# Patient Record
Sex: FEMALE | Race: WHITE | Employment: OTHER | URBAN - METROPOLITAN AREA
[De-identification: names, ages, dates, MRNs, and addresses within clinical notes are randomized per-mention and may not be internally consistent; named-entity substitution may affect disease eponyms.]

---

## 2017-04-06 ENCOUNTER — HOSPITAL ENCOUNTER (OUTPATIENT)
Dept: LAB | Age: 67
Discharge: HOME | End: 2017-04-06
Attending: FAMILY MEDICINE | Admitting: FAMILY MEDICINE

## 2017-04-06 LAB
CK MB: 1.2 NG/ML
TROPONIN T: <0.01 NG/ML

## 2018-01-24 ENCOUNTER — HOSPITAL ENCOUNTER (OUTPATIENT)
Dept: WOMENS IMAGING | Age: 68
Discharge: HOME | End: 2018-01-24
Attending: OBSTETRICS & GYNECOLOGY | Admitting: OBSTETRICS & GYNECOLOGY

## 2018-01-24 DIAGNOSIS — M85.80 OSTEOPENIA, UNSPECIFIED LOCATION: ICD-10-CM

## 2018-01-24 DIAGNOSIS — Z12.31 VISIT FOR SCREENING MAMMOGRAM: ICD-10-CM

## 2018-01-24 DIAGNOSIS — Z78.0 MENOPAUSE: ICD-10-CM

## 2018-01-25 LAB — CA 125: 13

## 2019-02-05 ENCOUNTER — HOSPITAL ENCOUNTER (OUTPATIENT)
Dept: WOMENS IMAGING | Age: 69
Discharge: HOME | End: 2019-02-05
Payer: MEDICARE

## 2019-02-05 ENCOUNTER — HOSPITAL ENCOUNTER (OUTPATIENT)
Age: 69
Discharge: HOME | End: 2019-02-05
Payer: MEDICARE

## 2019-02-05 DIAGNOSIS — Z12.31 SCREENING MAMMOGRAM, ENCOUNTER FOR: ICD-10-CM

## 2019-02-05 PROCEDURE — 77067 SCR MAMMO BI INCL CAD: CPT

## 2019-02-05 PROCEDURE — 86304 IMMUNOASSAY TUMOR CA 125: CPT

## 2019-02-05 PROCEDURE — 36415 COLL VENOUS BLD VENIPUNCTURE: CPT

## 2019-02-07 LAB — CA 125: 14

## 2019-08-06 ENCOUNTER — HOSPITAL ENCOUNTER (OUTPATIENT)
Dept: CT IMAGING | Age: 69
Discharge: HOME | End: 2019-08-06
Payer: MEDICARE

## 2019-08-06 DIAGNOSIS — R10.30 INGUINAL PAIN, UNSPECIFIED LATERALITY: ICD-10-CM

## 2019-08-06 PROCEDURE — 6360000004 HC RX CONTRAST MEDICATION: Performed by: FAMILY MEDICINE

## 2019-08-06 PROCEDURE — 74177 CT ABD & PELVIS W/CONTRAST: CPT

## 2019-08-06 RX ADMIN — IOPAMIDOL 75 ML: 755 INJECTION, SOLUTION INTRAVENOUS at 11:14

## 2020-02-12 ENCOUNTER — HOSPITAL ENCOUNTER (OUTPATIENT)
Dept: MAMMOGRAPHY | Age: 70
Discharge: HOME | End: 2020-02-12
Payer: MEDICARE

## 2020-02-12 PROCEDURE — 77063 BREAST TOMOSYNTHESIS BI: CPT

## 2020-08-14 ENCOUNTER — APPOINTMENT (OUTPATIENT)
Dept: GENERAL RADIOLOGY | Age: 70
End: 2020-08-14
Payer: MEDICARE

## 2020-08-14 ENCOUNTER — HOSPITAL ENCOUNTER (OUTPATIENT)
Age: 70
Setting detail: OBSERVATION
Discharge: HOME | End: 2020-08-17
Attending: EMERGENCY MEDICINE | Admitting: INTERNAL MEDICINE
Payer: MEDICARE

## 2020-08-14 PROBLEM — R07.9 CHEST PAIN: Status: ACTIVE | Noted: 2020-08-14

## 2020-08-14 LAB
ALBUMIN SERPL-MCNC: 4.3 GM/DL (ref 3.4–5)
ALP BLD-CCNC: 63 IU/L (ref 40–129)
ALT SERPL-CCNC: 12 U/L (ref 10–40)
ANION GAP SERPL CALCULATED.3IONS-SCNC: 12 MMOL/L (ref 4–16)
AST SERPL-CCNC: 21 IU/L (ref 15–37)
BASOPHILS ABSOLUTE: 0.1 K/CU MM
BASOPHILS RELATIVE PERCENT: 0.8 % (ref 0–1)
BILIRUB SERPL-MCNC: 0.6 MG/DL (ref 0–1)
BUN BLDV-MCNC: 24 MG/DL (ref 6–23)
CALCIUM SERPL-MCNC: 9.8 MG/DL (ref 8.3–10.6)
CHLORIDE BLD-SCNC: 106 MMOL/L (ref 99–110)
CO2: 24 MMOL/L (ref 21–32)
CREAT SERPL-MCNC: 1 MG/DL (ref 0.6–1.1)
D DIMER: <0.47 UG/ML (FEU)
DIFFERENTIAL TYPE: ABNORMAL
EKG ATRIAL RATE: 70 BPM
EKG DIAGNOSIS: NORMAL
EKG P AXIS: 48 DEGREES
EKG P-R INTERVAL: 148 MS
EKG Q-T INTERVAL: 398 MS
EKG QRS DURATION: 76 MS
EKG QTC CALCULATION (BAZETT): 429 MS
EKG R AXIS: 55 DEGREES
EKG T AXIS: 43 DEGREES
EKG VENTRICULAR RATE: 70 BPM
EOSINOPHILS ABSOLUTE: 0.1 K/CU MM
EOSINOPHILS RELATIVE PERCENT: 0.8 % (ref 0–3)
GFR AFRICAN AMERICAN: >60 ML/MIN/1.73M2
GFR NON-AFRICAN AMERICAN: 55 ML/MIN/1.73M2
GLUCOSE BLD-MCNC: 92 MG/DL (ref 70–99)
HCT VFR BLD CALC: 43.7 % (ref 37–47)
HCT VFR BLD CALC: 45 % (ref 37–47)
HEMOGLOBIN: 14.5 GM/DL (ref 12.5–16)
HEMOGLOBIN: 14.5 GM/DL (ref 12.5–16)
IMMATURE NEUTROPHIL %: 0.2 % (ref 0–0.43)
LYMPHOCYTES ABSOLUTE: 1.6 K/CU MM
LYMPHOCYTES RELATIVE PERCENT: 26.9 % (ref 24–44)
MCH RBC QN AUTO: 29.7 PG (ref 27–31)
MCH RBC QN AUTO: 30.1 PG (ref 27–31)
MCHC RBC AUTO-ENTMCNC: 32.2 % (ref 32–36)
MCHC RBC AUTO-ENTMCNC: 33.2 % (ref 32–36)
MCV RBC AUTO: 90.9 FL (ref 78–100)
MCV RBC AUTO: 92.2 FL (ref 78–100)
MONOCYTES ABSOLUTE: 0.3 K/CU MM
MONOCYTES RELATIVE PERCENT: 5 % (ref 0–4)
PDW BLD-RTO: 12.8 % (ref 11.7–14.9)
PDW BLD-RTO: 12.9 % (ref 11.7–14.9)
PLATELET # BLD: 156 K/CU MM (ref 140–440)
PLATELET # BLD: 164 K/CU MM (ref 140–440)
PMV BLD AUTO: 9.3 FL (ref 7.5–11.1)
PMV BLD AUTO: 9.6 FL (ref 7.5–11.1)
POTASSIUM SERPL-SCNC: 4.3 MMOL/L (ref 3.5–5.1)
RBC # BLD: 4.81 M/CU MM (ref 4.2–5.4)
RBC # BLD: 4.88 M/CU MM (ref 4.2–5.4)
REASON FOR REJECTION: NORMAL
REASON FOR REJECTION: NORMAL
REJECTED TEST: NORMAL
REJECTED TEST: NORMAL
SEGMENTED NEUTROPHILS ABSOLUTE COUNT: 4 K/CU MM
SEGMENTED NEUTROPHILS RELATIVE PERCENT: 66.3 % (ref 36–66)
SODIUM BLD-SCNC: 142 MMOL/L (ref 135–145)
TOTAL IMMATURE NEUTOROPHIL: 0.01 K/CU MM
TOTAL PROTEIN: 6.6 GM/DL (ref 6.4–8.2)
TROPONIN T: <0.01 NG/ML
WBC # BLD: 6 K/CU MM (ref 4–10.5)
WBC # BLD: 6.9 K/CU MM (ref 4–10.5)

## 2020-08-14 PROCEDURE — 94761 N-INVAS EAR/PLS OXIMETRY MLT: CPT

## 2020-08-14 PROCEDURE — G0378 HOSPITAL OBSERVATION PER HR: HCPCS

## 2020-08-14 PROCEDURE — 84484 ASSAY OF TROPONIN QUANT: CPT

## 2020-08-14 PROCEDURE — 71045 X-RAY EXAM CHEST 1 VIEW: CPT

## 2020-08-14 PROCEDURE — 93010 ELECTROCARDIOGRAM REPORT: CPT | Performed by: INTERNAL MEDICINE

## 2020-08-14 PROCEDURE — 85025 COMPLETE CBC W/AUTO DIFF WBC: CPT

## 2020-08-14 PROCEDURE — 80053 COMPREHEN METABOLIC PANEL: CPT

## 2020-08-14 PROCEDURE — 99285 EMERGENCY DEPT VISIT HI MDM: CPT

## 2020-08-14 PROCEDURE — 85027 COMPLETE CBC AUTOMATED: CPT

## 2020-08-14 PROCEDURE — 80048 BASIC METABOLIC PNL TOTAL CA: CPT

## 2020-08-14 PROCEDURE — 93005 ELECTROCARDIOGRAM TRACING: CPT | Performed by: EMERGENCY MEDICINE

## 2020-08-14 PROCEDURE — 85379 FIBRIN DEGRADATION QUANT: CPT

## 2020-08-14 RX ORDER — NITROGLYCERIN 0.4 MG/1
0.4 TABLET SUBLINGUAL EVERY 5 MIN PRN
Status: DISCONTINUED | OUTPATIENT
Start: 2020-08-14 | End: 2020-08-17 | Stop reason: HOSPADM

## 2020-08-14 RX ORDER — PANTOPRAZOLE SODIUM 40 MG/1
40 GRANULE, DELAYED RELEASE ORAL
COMMUNITY

## 2020-08-14 RX ORDER — POLYETHYLENE GLYCOL 3350 17 G/17G
17 POWDER, FOR SOLUTION ORAL DAILY PRN
Status: DISCONTINUED | OUTPATIENT
Start: 2020-08-14 | End: 2020-08-17 | Stop reason: HOSPADM

## 2020-08-14 RX ORDER — MONTELUKAST SODIUM 10 MG/1
10 TABLET ORAL NIGHTLY
COMMUNITY
End: 2022-01-06

## 2020-08-14 RX ORDER — ACETAMINOPHEN 650 MG/1
650 SUPPOSITORY RECTAL EVERY 6 HOURS PRN
Status: DISCONTINUED | OUTPATIENT
Start: 2020-08-14 | End: 2020-08-17 | Stop reason: HOSPADM

## 2020-08-14 RX ORDER — FLUTICASONE PROPIONATE 50 MCG
2 SPRAY, SUSPENSION (ML) NASAL DAILY
COMMUNITY

## 2020-08-14 RX ORDER — SERTRALINE HYDROCHLORIDE 100 MG/1
100 TABLET, FILM COATED ORAL DAILY
COMMUNITY

## 2020-08-14 RX ORDER — SODIUM CHLORIDE 9 MG/ML
INJECTION, SOLUTION INTRAVENOUS CONTINUOUS
Status: ACTIVE | OUTPATIENT
Start: 2020-08-14 | End: 2020-08-15

## 2020-08-14 RX ORDER — PROMETHAZINE HYDROCHLORIDE 25 MG/1
12.5 TABLET ORAL EVERY 6 HOURS PRN
Status: DISCONTINUED | OUTPATIENT
Start: 2020-08-14 | End: 2020-08-17 | Stop reason: HOSPADM

## 2020-08-14 RX ORDER — GABAPENTIN 100 MG/1
100 CAPSULE ORAL 3 TIMES DAILY
COMMUNITY

## 2020-08-14 RX ORDER — M-VIT,TX,IRON,MINS/CALC/FOLIC 27MG-0.4MG
1 TABLET ORAL DAILY
COMMUNITY

## 2020-08-14 RX ORDER — AZELASTINE 1 MG/ML
1 SPRAY, METERED NASAL 2 TIMES DAILY
COMMUNITY

## 2020-08-14 RX ORDER — OMEGA-3S/DHA/EPA/FISH OIL/D3 300MG-1000
1000 CAPSULE ORAL DAILY
COMMUNITY

## 2020-08-14 RX ORDER — SODIUM CHLORIDE 0.9 % (FLUSH) 0.9 %
10 SYRINGE (ML) INJECTION PRN
Status: DISCONTINUED | OUTPATIENT
Start: 2020-08-14 | End: 2020-08-17 | Stop reason: HOSPADM

## 2020-08-14 RX ORDER — ONDANSETRON 2 MG/ML
4 INJECTION INTRAMUSCULAR; INTRAVENOUS EVERY 6 HOURS PRN
Status: DISCONTINUED | OUTPATIENT
Start: 2020-08-14 | End: 2020-08-17 | Stop reason: HOSPADM

## 2020-08-14 RX ORDER — ESTRADIOL 0.1 MG/G
2 CREAM VAGINAL DAILY
COMMUNITY

## 2020-08-14 RX ORDER — ACETAMINOPHEN 325 MG/1
650 TABLET ORAL EVERY 6 HOURS PRN
Status: DISCONTINUED | OUTPATIENT
Start: 2020-08-14 | End: 2020-08-17 | Stop reason: HOSPADM

## 2020-08-14 RX ORDER — DOCUSATE SODIUM 100 MG/1
100 CAPSULE, LIQUID FILLED ORAL 2 TIMES DAILY
COMMUNITY

## 2020-08-14 RX ORDER — EZETIMIBE 10 MG/1
10 TABLET ORAL DAILY
COMMUNITY

## 2020-08-14 RX ORDER — SODIUM CHLORIDE 0.9 % (FLUSH) 0.9 %
10 SYRINGE (ML) INJECTION EVERY 12 HOURS SCHEDULED
Status: DISCONTINUED | OUTPATIENT
Start: 2020-08-14 | End: 2020-08-17 | Stop reason: HOSPADM

## 2020-08-14 ASSESSMENT — PAIN SCALES - GENERAL
PAINLEVEL_OUTOF10: 8
PAINLEVEL_OUTOF10: 9

## 2020-08-14 ASSESSMENT — ENCOUNTER SYMPTOMS
CHEST TIGHTNESS: 1
VOMITING: 0
EYE REDNESS: 0
RHINORRHEA: 0
NAUSEA: 0
SHORTNESS OF BREATH: 0
COUGH: 0
ABDOMINAL PAIN: 0
BACK PAIN: 0
SORE THROAT: 0

## 2020-08-14 ASSESSMENT — PAIN DESCRIPTION - FREQUENCY: FREQUENCY: CONTINUOUS

## 2020-08-14 ASSESSMENT — PAIN DESCRIPTION - LOCATION
LOCATION: CHEST
LOCATION: CHEST

## 2020-08-14 ASSESSMENT — PAIN DESCRIPTION - PAIN TYPE
TYPE: ACUTE PAIN
TYPE: ACUTE PAIN

## 2020-08-14 ASSESSMENT — PAIN DESCRIPTION - DESCRIPTORS: DESCRIPTORS: PRESSURE

## 2020-08-14 NOTE — ED PROVIDER NOTES
Inability: Not on file    Transportation needs     Medical: Not on file     Non-medical: Not on file   Tobacco Use    Smoking status: Never Smoker   Substance and Sexual Activity    Alcohol use: Never    Drug use: Never    Sexual activity: Never   Lifestyle    Physical activity     Days per week: Not on file     Minutes per session: Not on file    Stress: Not on file   Relationships    Social connections     Talks on phone: Not on file     Gets together: Not on file     Attends Druze service: Not on file     Active member of club or organization: Not on file     Attends meetings of clubs or organizations: Not on file     Relationship status: Not on file    Intimate partner violence     Fear of current or ex partner: Not on file     Emotionally abused: Not on file     Physically abused: Not on file     Forced sexual activity: Not on file   Other Topics Concern    Not on file   Social History Narrative    Not on file     No current facility-administered medications for this encounter. No current outpatient medications on file. Allergies   Allergen Reactions    Pcn [Penicillins] Hives    Statins      Pain in legs     Sulfa Antibiotics      Leg cramps     Vioxx [Rofecoxib]      Heart palpitation        Nursing Notes Reviewed     Physical Exam:   ED Triage Vitals   Enc Vitals Group      BP       Pulse       Resp       Temp       Temp src       SpO2       Weight       Height       Head Circumference       Peak Flow       Pain Score       Pain Loc       Pain Edu? Excl. in [address]? BP (!) 110/57   Pulse 60   Temp 97.3 °F (36.3 °C) (Temporal)   Resp 13   Ht 5' 5\" (1.651 m)   Wt 151 lb (68.5 kg)   SpO2 94%   BMI 25.13 kg/m²   My pulse ox interpretation is - normal  Physical Exam  Vitals signs and nursing note reviewed. Constitutional:       General: She is not in acute distress. Appearance: Normal appearance. She is not toxic-appearing or diaphoretic.    HENT:      Head: Normocephalic and atraumatic. Eyes:      General:         Right eye: No discharge. Left eye: No discharge. Conjunctiva/sclera: Conjunctivae normal.   Cardiovascular:      Rate and Rhythm: Normal rate and regular rhythm. Pulses: Normal pulses. Radial pulses are 2+ on the right side and 2+ on the left side. Pulmonary:      Effort: Pulmonary effort is normal. No respiratory distress. Breath sounds: No wheezing or rales. Abdominal:      General: There is no distension. Tenderness: There is no abdominal tenderness. Musculoskeletal: Normal range of motion. General: No swelling or tenderness. Skin:     General: Skin is warm and dry. Neurological:      General: No focal deficit present. Mental Status: She is alert. Cranial Nerves: No cranial nerve deficit.    Psychiatric:         Mood and Affect: Mood normal.         Behavior: Behavior normal.         I have reviewed and interpreted all of the currently available lab results from this visit (if applicable):  Results for orders placed or performed during the hospital encounter of 08/14/20   CBC Auto Differential   Result Value Ref Range    WBC 6.0 4.0 - 10.5 K/CU MM    RBC 4.81 4.2 - 5.4 M/CU MM    Hemoglobin 14.5 12.5 - 16.0 GM/DL    Hematocrit 43.7 37 - 47 %    MCV 90.9 78 - 100 FL    MCH 30.1 27 - 31 PG    MCHC 33.2 32.0 - 36.0 %    RDW 12.8 11.7 - 14.9 %    Platelets 282 995 - 603 K/CU MM    MPV 9.6 7.5 - 11.1 FL    Differential Type AUTOMATED DIFFERENTIAL     Segs Relative 66.3 (H) 36 - 66 %    Lymphocytes % 26.9 24 - 44 %    Monocytes % 5.0 (H) 0 - 4 %    Eosinophils % 0.8 0 - 3 %    Basophils % 0.8 0 - 1 %    Segs Absolute 4.0 K/CU MM    Lymphocytes Absolute 1.6 K/CU MM    Monocytes Absolute 0.3 K/CU MM    Eosinophils Absolute 0.1 K/CU MM    Basophils Absolute 0.1 K/CU MM    Immature Neutrophil % 0.2 0 - 0.43 %    Total Immature Neutrophil 0.01 K/CU MM   D-Dimer, Rapid   Result Value Ref Range D-Dimer, Quant <0.47 <0.47 ug/mL (FEU)   SPECIMEN REJECTION   Result Value Ref Range    Rejected Test TROT     Reason for Rejection UNABLE TO PERFORM TESTING:    SPECIMEN REJECTION   Result Value Ref Range    Rejected Test CMPX     Reason for Rejection UNABLE TO PERFORM TESTING:    Comprehensive Metabolic Panel w/ Reflex to MG   Result Value Ref Range    Sodium 142 135 - 145 MMOL/L    Potassium 4.3 3.5 - 5.1 MMOL/L    Chloride 106 99 - 110 mMol/L    CO2 24 21 - 32 MMOL/L    BUN 24 (H) 6 - 23 MG/DL    CREATININE 1.0 0.6 - 1.1 MG/DL    Glucose 92 70 - 99 MG/DL    Calcium 9.8 8.3 - 10.6 MG/DL    Alb 4.3 3.4 - 5.0 GM/DL    Total Protein 6.6 6.4 - 8.2 GM/DL    Total Bilirubin 0.6 0.0 - 1.0 MG/DL    ALT 12 10 - 40 U/L    AST 21 15 - 37 IU/L    Alkaline Phosphatase 63 40 - 129 IU/L    GFR Non- 55 (L) >60 mL/min/1.73m2    GFR African American >60 >60 mL/min/1.73m2    Anion Gap 12 4 - 16   Troponin   Result Value Ref Range    Troponin T <0.010 <0.01 NG/ML   EKG 12 Lead   Result Value Ref Range    Ventricular Rate 70 BPM    Atrial Rate 70 BPM    P-R Interval 148 ms    QRS Duration 76 ms    Q-T Interval 398 ms    QTc Calculation (Bazett) 429 ms    P Axis 48 degrees    R Axis 55 degrees    T Axis 43 degrees    Diagnosis       Normal sinus rhythm  Nonspecific T wave abnormality  Abnormal ECG  No previous ECGs available  Confirmed by [facility] Sahil RAMIREZ ([id number]) on 8/14/2020 4:17:10 PM        Radiographs (if obtained):  [] The following radiograph was interpreted by myself in the absence of a radiologist:  [x]Radiologist's Report Reviewed:  XR CHEST PORTABLE   Final Result   No acute process. EKG (if obtained): (All EKG's are interpreted by myself in the absence of a cardiologist)  Normal sinus rhythm with a rate of 70. RI interval 148, QRS 76, QTc 429. No ST elevations or depressions. Nonspecific T waves. Impression: Abnormal EKG. No previous EKGs for comparison.     MDM:  Differential diagnoses considered include but are not limited to acute coronary syndrome, Pulmonary embolism, pneumothorax, pneumonia, costochondritis, aortic dissection, GERD    ED course:   EKG has some T wave inversions but no ST elevations or depressions. Basic labs were obtained and are unremarkable. Initial troponin is negative. Pt is low risk for PE by Well's criteria and PERC positive 2/2 age. D-dimer obtained and it is negative. No further work up for pulmonary embolism is warranted. I do not suspect  pulmonary embolism. Chest x-ray shows no acute cardiopulmonary abnormalities. No pneumothorax and no pneumonia. No mediastinal widening. Equal and intact peripheral pulses. Pain is not tearing in quality or migratory. No neurologic deficits. I do not suspect an aortic dissection. Heart Score = 5   0-3 Delta troponin and discharge  4-6 Observation chest pain protocol  7+ Admission to cardiology  History   Highly suspicious -- +2   Moderately suspicious -- +1   Slightly suspicious -- 0   EKG   Significant ST depression -- +2   Nonspecific repolarization disturbance -- +1   Normal -- 0   Age   ? 65 -- +2   45-65 -- +1   ? 45 -- 0  Risk Factors   Risk factors include:   Hypercholesterolemia   Hypertension   Diabetes Mellitus   Cigarette smoking   Positive family history   Obesity (BMI > 30)  ? 3 risk factors or history of atherosclerotic disease -- +2   1-2 risk factors -- +1   No risk factors known -- 0  Troponin   ? 3× normal limit -- +2   1-3× normal limit -- +1   ? normal limit -- 0  *The HEART Score is a prospectively studied scoring system to help emergency physicians risk-stratify chest pain patients who are at risk for all-cause mortality, myocardial infarction, or coronary revascularization in the next 6 weeks. Low risk patients have a score 0-3 and have a less than 2% risk of major event at 6 weeks. *    Cardiac monitor revealed sinus rhythm with a heart rate of 70 as interpreted by me.   Patient presented with chest pain and cardiac monitor was ordered to monitor for dysrhythmia. Given patient's heart score of 5, recommended admission for cardiac rule out. Patient is in agreement with this. Plan [facility] for admission and cardiac rule out. I spoke with Dr. Marifer Urrutia who accepted patient in transfer. Plan of care explained to patient. All questions and concerns were addressed to the patient's satisfaction. Patient understood and agreed with plan. I did don appropriate PPE (including N95 face mask, protective eye ware/safety glasses, gloves, hair covering, and no isolation gown), as recommended by the health facility/national standard best practice, during my bedside interactions with the patient. Clinical Impression:  1. Chest pain, unspecified type          Daniela Cobb MD       Please note that portions of this note may have been complete with a voice recognition program.  Effortswere made to edit the dictations, but occasional words are mis-transcribed. Daniela Cobb MD  08/14/20 1703    Unable to get ALS transport for over 12 hours. Discussed this with patient and her . Discussed the risk of BLS transport and patient not being on the monitor for the time between the 2 hospitals. They understand this risk and believe it would be better to be transported sooner to the main hospital as opposed to wait even longer in this emergency department. I agree with that. We will transfer patient via BLS.         Daniela Cobb MD  08/14/20 3495

## 2020-08-14 NOTE — ED NOTES
RN attempted to arrange ALS transport for patient with [facility], [address] [facility], [place] [address], [facility], [facility], [facility], and [facility].   All services stated they could not provide transport until tomorrow morning after 7AM.  Dr. Gregg Alford notified     Shailesh Bates, RANDY  08/14/20 5570

## 2020-08-14 NOTE — ED NOTES
BLS transport agreed upon by [facility] and pt. QCT eta midnight. Pt moved from ed 3 to ed 2 due to TV issues. Pt denies needs at this time.  Plan of care updated     Peter Wang, RANDY  08/14/20 1519

## 2020-08-15 LAB
ANION GAP SERPL CALCULATED.3IONS-SCNC: 12 MMOL/L (ref 4–16)
ANION GAP SERPL CALCULATED.3IONS-SCNC: 8 MMOL/L (ref 4–16)
BASOPHILS ABSOLUTE: 0.1 K/CU MM
BASOPHILS RELATIVE PERCENT: 0.8 % (ref 0–1)
BUN BLDV-MCNC: 22 MG/DL (ref 6–23)
BUN BLDV-MCNC: 24 MG/DL (ref 6–23)
CALCIUM SERPL-MCNC: 9.2 MG/DL (ref 8.3–10.6)
CALCIUM SERPL-MCNC: 9.6 MG/DL (ref 8.3–10.6)
CHLORIDE BLD-SCNC: 107 MMOL/L (ref 99–110)
CHLORIDE BLD-SCNC: 108 MMOL/L (ref 99–110)
CO2: 25 MMOL/L (ref 21–32)
CO2: 27 MMOL/L (ref 21–32)
CREAT SERPL-MCNC: 0.9 MG/DL (ref 0.6–1.1)
CREAT SERPL-MCNC: 1 MG/DL (ref 0.6–1.1)
DIFFERENTIAL TYPE: ABNORMAL
EKG ATRIAL RATE: 58 BPM
EKG DIAGNOSIS: NORMAL
EKG P AXIS: 51 DEGREES
EKG P-R INTERVAL: 164 MS
EKG Q-T INTERVAL: 430 MS
EKG QRS DURATION: 76 MS
EKG QTC CALCULATION (BAZETT): 422 MS
EKG R AXIS: 66 DEGREES
EKG T AXIS: 65 DEGREES
EKG VENTRICULAR RATE: 58 BPM
EOSINOPHILS ABSOLUTE: 0.1 K/CU MM
EOSINOPHILS RELATIVE PERCENT: 1.7 % (ref 0–3)
GFR AFRICAN AMERICAN: >60 ML/MIN/1.73M2
GFR AFRICAN AMERICAN: >60 ML/MIN/1.73M2
GFR NON-AFRICAN AMERICAN: 55 ML/MIN/1.73M2
GFR NON-AFRICAN AMERICAN: >60 ML/MIN/1.73M2
GLUCOSE BLD-MCNC: 102 MG/DL (ref 70–99)
GLUCOSE BLD-MCNC: 106 MG/DL (ref 70–99)
HCT VFR BLD CALC: 42.5 % (ref 37–47)
HEMOGLOBIN: 13.6 GM/DL (ref 12.5–16)
IMMATURE NEUTROPHIL %: 0.2 % (ref 0–0.43)
LYMPHOCYTES ABSOLUTE: 2.1 K/CU MM
LYMPHOCYTES RELATIVE PERCENT: 31.5 % (ref 24–44)
MCH RBC QN AUTO: 29.8 PG (ref 27–31)
MCHC RBC AUTO-ENTMCNC: 32 % (ref 32–36)
MCV RBC AUTO: 93.2 FL (ref 78–100)
MONOCYTES ABSOLUTE: 0.5 K/CU MM
MONOCYTES RELATIVE PERCENT: 6.9 % (ref 0–4)
NUCLEATED RBC %: 0 %
PDW BLD-RTO: 12.9 % (ref 11.7–14.9)
PLATELET # BLD: 158 K/CU MM (ref 140–440)
PMV BLD AUTO: 9.5 FL (ref 7.5–11.1)
POTASSIUM SERPL-SCNC: 4.1 MMOL/L (ref 3.5–5.1)
POTASSIUM SERPL-SCNC: 4.3 MMOL/L (ref 3.5–5.1)
RBC # BLD: 4.56 M/CU MM (ref 4.2–5.4)
SEGMENTED NEUTROPHILS ABSOLUTE COUNT: 3.9 K/CU MM
SEGMENTED NEUTROPHILS RELATIVE PERCENT: 58.9 % (ref 36–66)
SODIUM BLD-SCNC: 143 MMOL/L (ref 135–145)
SODIUM BLD-SCNC: 144 MMOL/L (ref 135–145)
TOTAL IMMATURE NEUTOROPHIL: 0.01 K/CU MM
TOTAL NUCLEATED RBC: 0 K/CU MM
TROPONIN T: <0.01 NG/ML
TROPONIN T: <0.01 NG/ML
WBC # BLD: 6.6 K/CU MM (ref 4–10.5)

## 2020-08-15 PROCEDURE — 6370000000 HC RX 637 (ALT 250 FOR IP): Performed by: NURSE PRACTITIONER

## 2020-08-15 PROCEDURE — 6370000000 HC RX 637 (ALT 250 FOR IP): Performed by: INTERNAL MEDICINE

## 2020-08-15 PROCEDURE — G0378 HOSPITAL OBSERVATION PER HR: HCPCS

## 2020-08-15 PROCEDURE — 84484 ASSAY OF TROPONIN QUANT: CPT

## 2020-08-15 PROCEDURE — 6360000002 HC RX W HCPCS: Performed by: NURSE PRACTITIONER

## 2020-08-15 PROCEDURE — 99215 OFFICE O/P EST HI 40 MIN: CPT | Performed by: INTERNAL MEDICINE

## 2020-08-15 PROCEDURE — 93010 ELECTROCARDIOGRAM REPORT: CPT | Performed by: INTERNAL MEDICINE

## 2020-08-15 PROCEDURE — 96372 THER/PROPH/DIAG INJ SC/IM: CPT

## 2020-08-15 PROCEDURE — 85025 COMPLETE CBC W/AUTO DIFF WBC: CPT

## 2020-08-15 PROCEDURE — 80048 BASIC METABOLIC PNL TOTAL CA: CPT

## 2020-08-15 PROCEDURE — 93005 ELECTROCARDIOGRAM TRACING: CPT | Performed by: NURSE PRACTITIONER

## 2020-08-15 PROCEDURE — 36415 COLL VENOUS BLD VENIPUNCTURE: CPT

## 2020-08-15 PROCEDURE — 2580000003 HC RX 258: Performed by: NURSE PRACTITIONER

## 2020-08-15 PROCEDURE — 94761 N-INVAS EAR/PLS OXIMETRY MLT: CPT

## 2020-08-15 RX ORDER — NITROGLYCERIN 0.4 MG/1
0.4 TABLET SUBLINGUAL EVERY 5 MIN PRN
Status: DISCONTINUED | OUTPATIENT
Start: 2020-08-15 | End: 2020-08-17 | Stop reason: HOSPADM

## 2020-08-15 RX ORDER — EZETIMIBE 10 MG/1
10 TABLET ORAL DAILY
Status: DISCONTINUED | OUTPATIENT
Start: 2020-08-15 | End: 2020-08-17 | Stop reason: HOSPADM

## 2020-08-15 RX ORDER — SERTRALINE HYDROCHLORIDE 100 MG/1
100 TABLET, FILM COATED ORAL DAILY
Status: DISCONTINUED | OUTPATIENT
Start: 2020-08-15 | End: 2020-08-17 | Stop reason: HOSPADM

## 2020-08-15 RX ORDER — SUCRALFATE 1 G/1
1 TABLET ORAL EVERY 6 HOURS SCHEDULED
Status: DISCONTINUED | OUTPATIENT
Start: 2020-08-15 | End: 2020-08-17 | Stop reason: HOSPADM

## 2020-08-15 RX ORDER — ESTRADIOL 0.1 MG/G
2 CREAM VAGINAL DAILY
Status: DISCONTINUED | OUTPATIENT
Start: 2020-08-15 | End: 2020-08-17 | Stop reason: HOSPADM

## 2020-08-15 RX ORDER — PANTOPRAZOLE SODIUM 40 MG/1
40 TABLET, DELAYED RELEASE ORAL ONCE
Status: COMPLETED | OUTPATIENT
Start: 2020-08-15 | End: 2020-08-15

## 2020-08-15 RX ORDER — GABAPENTIN 100 MG/1
100 CAPSULE ORAL 3 TIMES DAILY
Status: DISCONTINUED | OUTPATIENT
Start: 2020-08-15 | End: 2020-08-17 | Stop reason: HOSPADM

## 2020-08-15 RX ORDER — MONTELUKAST SODIUM 10 MG/1
10 TABLET ORAL NIGHTLY
Status: DISCONTINUED | OUTPATIENT
Start: 2020-08-15 | End: 2020-08-17 | Stop reason: HOSPADM

## 2020-08-15 RX ORDER — M-VIT,TX,IRON,MINS/CALC/FOLIC 27MG-0.4MG
1 TABLET ORAL DAILY
Status: DISCONTINUED | OUTPATIENT
Start: 2020-08-15 | End: 2020-08-17 | Stop reason: HOSPADM

## 2020-08-15 RX ORDER — AZELASTINE 1 MG/ML
2 SPRAY, METERED NASAL 2 TIMES DAILY
Status: DISCONTINUED | OUTPATIENT
Start: 2020-08-15 | End: 2020-08-17 | Stop reason: HOSPADM

## 2020-08-15 RX ORDER — FLUTICASONE PROPIONATE 50 MCG
2 SPRAY, SUSPENSION (ML) NASAL DAILY
Status: DISCONTINUED | OUTPATIENT
Start: 2020-08-15 | End: 2020-08-17 | Stop reason: HOSPADM

## 2020-08-15 RX ORDER — PANTOPRAZOLE SODIUM 40 MG/1
40 TABLET, DELAYED RELEASE ORAL
Status: DISCONTINUED | OUTPATIENT
Start: 2020-08-16 | End: 2020-08-17 | Stop reason: HOSPADM

## 2020-08-15 RX ORDER — CALCIUM CARBONATE 200(500)MG
500 TABLET,CHEWABLE ORAL 3 TIMES DAILY PRN
Status: DISCONTINUED | OUTPATIENT
Start: 2020-08-15 | End: 2020-08-17 | Stop reason: HOSPADM

## 2020-08-15 RX ORDER — DOCUSATE SODIUM 100 MG/1
100 CAPSULE, LIQUID FILLED ORAL 2 TIMES DAILY
Status: DISCONTINUED | OUTPATIENT
Start: 2020-08-15 | End: 2020-08-17 | Stop reason: HOSPADM

## 2020-08-15 RX ADMIN — SODIUM CHLORIDE, PRESERVATIVE FREE 10 ML: 5 INJECTION INTRAVENOUS at 10:29

## 2020-08-15 RX ADMIN — GABAPENTIN 100 MG: 100 CAPSULE ORAL at 14:10

## 2020-08-15 RX ADMIN — MULTIPLE VITAMINS W/ MINERALS TAB 1 TABLET: TAB at 10:27

## 2020-08-15 RX ADMIN — FLUTICASONE PROPIONATE 2 SPRAY: 50 SPRAY, METERED NASAL at 10:22

## 2020-08-15 RX ADMIN — EZETIMIBE 10 MG: 10 TABLET ORAL at 21:33

## 2020-08-15 RX ADMIN — SODIUM CHLORIDE, PRESERVATIVE FREE 10 ML: 5 INJECTION INTRAVENOUS at 00:33

## 2020-08-15 RX ADMIN — ENOXAPARIN SODIUM 30 MG: 30 INJECTION SUBCUTANEOUS at 10:25

## 2020-08-15 RX ADMIN — MONTELUKAST 10 MG: 10 TABLET, FILM COATED ORAL at 21:33

## 2020-08-15 RX ADMIN — PANTOPRAZOLE SODIUM 40 MG: 40 TABLET, DELAYED RELEASE ORAL at 10:24

## 2020-08-15 RX ADMIN — SERTRALINE HYDROCHLORIDE 100 MG: 100 TABLET ORAL at 21:32

## 2020-08-15 RX ADMIN — SODIUM CHLORIDE: 9 INJECTION, SOLUTION INTRAVENOUS at 00:33

## 2020-08-15 RX ADMIN — OYSTER SHELL CALCIUM WITH VITAMIN D 1 TABLET: 500; 200 TABLET, FILM COATED ORAL at 10:24

## 2020-08-15 RX ADMIN — SUCRALFATE 1 G: 1 TABLET ORAL at 11:57

## 2020-08-15 RX ADMIN — METOPROLOL TARTRATE 12.5 MG: 25 TABLET, FILM COATED ORAL at 00:33

## 2020-08-15 RX ADMIN — ACETAMINOPHEN 650 MG: 325 TABLET ORAL at 10:24

## 2020-08-15 RX ADMIN — METOPROLOL TARTRATE 12.5 MG: 25 TABLET, FILM COATED ORAL at 10:24

## 2020-08-15 RX ADMIN — MONTELUKAST 10 MG: 10 TABLET, FILM COATED ORAL at 00:33

## 2020-08-15 RX ADMIN — GABAPENTIN 100 MG: 100 CAPSULE ORAL at 10:24

## 2020-08-15 RX ADMIN — SODIUM CHLORIDE, PRESERVATIVE FREE 10 ML: 5 INJECTION INTRAVENOUS at 21:33

## 2020-08-15 RX ADMIN — GABAPENTIN 100 MG: 100 CAPSULE ORAL at 21:32

## 2020-08-15 RX ADMIN — Medication 1000 UNITS: at 10:27

## 2020-08-15 RX ADMIN — NITROGLYCERIN 0.4 MG: 0.4 TABLET SUBLINGUAL at 14:08

## 2020-08-15 RX ADMIN — SUCRALFATE 1 G: 1 TABLET ORAL at 17:56

## 2020-08-15 RX ADMIN — AZELASTINE 2 SPRAY: 137 SPRAY, METERED NASAL at 10:22

## 2020-08-15 ASSESSMENT — PAIN DESCRIPTION - PAIN TYPE
TYPE: ACUTE PAIN
TYPE: ACUTE PAIN

## 2020-08-15 ASSESSMENT — PAIN SCALES - GENERAL
PAINLEVEL_OUTOF10: 0
PAINLEVEL_OUTOF10: 3
PAINLEVEL_OUTOF10: 7
PAINLEVEL_OUTOF10: 8
PAINLEVEL_OUTOF10: 6

## 2020-08-15 ASSESSMENT — PAIN DESCRIPTION - FREQUENCY: FREQUENCY: CONTINUOUS

## 2020-08-15 ASSESSMENT — PAIN DESCRIPTION - ORIENTATION
ORIENTATION_2: LEFT
ORIENTATION: LEFT;MID
ORIENTATION: LEFT;MID

## 2020-08-15 ASSESSMENT — PAIN DESCRIPTION - DESCRIPTORS: DESCRIPTORS: PRESSURE

## 2020-08-15 ASSESSMENT — PAIN DESCRIPTION - INTENSITY: RATING_2: 0

## 2020-08-15 ASSESSMENT — PAIN DESCRIPTION - LOCATION
LOCATION: CHEST
LOCATION_2: HEAD
LOCATION: CHEST

## 2020-08-15 ASSESSMENT — PAIN DESCRIPTION - ONSET: ONSET: ON-GOING

## 2020-08-15 NOTE — PROGRESS NOTES
[address]    Hospitalist Progress Note      Name:  Katia King /Age/Sex: 1950  (69 y.o. female)   MRN & CSN:  [id number] & [id number] Admission Date/Time: 2020  2:25 PM   Location:  [address] [place] [address] PCP: Galen Long MD         Hospital Day: 2    Assessment and Plan:   Katia King is a 69 y.o.  female  who presents with chest pain    Chest pain rule out ACS    Troponin were all negative, EKG with no ST changes  Consulted cardiology with a plan for stress test  Check hemoglobin A1c, lipid profile  Continue with Zetia, ASA, Lopressor -   ,  GERD -continue with Protonix and sucralfate    Hypertension -well controlled with Lopressor mostly on the lower side    Depression -continue with Zoloft  ,   Diet DIET CARDIAC;   DVT Prophylaxis [] Lovenox, []  Heparin, [] SCDs, []No VTE prophylaxis, patient ambulating   GI Prophylaxis [] PPI, [] H2 Blocker, [] No GI prophylaxis, patient is receiving diet/Tube Feeds   Code Status Full Code   Disposition Patient requires continued admission due to chest pain   MDM [] Low, [] Moderate,[]  High     History of Present Illness: Subjective     Patient Seen & Examined at the bedside      Patient is resting in bed with no distress while on room air   Continues to have pressure-like midsternal chest pain  No associated shortness of breath or presyncopal feeling    Ten point ROS reviewed negative, unless as noted above    Objective: Intake/Output Summary (Last 24 hours) at 8/15/2020 1540  Last data filed at 8/15/2020 1029  Gross per 24 hour   Intake 10 ml   Output --   Net 10 ml      Vitals:   Vitals:    08/15/20 1430   BP: (!) 91/53   Pulse:    Resp:    Temp:    SpO2:      Physical Exam:    GEN Awake female, resting in bed in no apparent distress. Appears given age. HENT Mucous membranes are moist.   RESP Clear to auscultation, no wheezes, rales or rhonchi. CARDIO/VASC -S1/S2 auscultated. Regular rate without appreciable murmurs, rubs, or gallops.  Peripheral pulses equal bilaterally and palpable. No peripheral edema. GI Abdomen is soft without significant tenderness, masses, or guarding. Bowel sounds are normoactive. Rectal exam deferred.  Garcias catheter is not present.     Medications:   Medications:    azelastine  2 spray Each Nostril BID    calcium-vitamin D  1 tablet Oral Daily    docusate sodium  100 mg Oral BID    estradiol  2 g Vaginal Daily    ezetimibe  10 mg Oral Daily    fluticasone  2 spray Each Nostril Daily    gabapentin  100 mg Oral TID    montelukast  10 mg Oral Nightly    therapeutic multivitamin-minerals  1 tablet Oral Daily    [START ON 8/16/2020] pantoprazole  40 mg Oral QAM AC    sertraline  100 mg Oral Daily    vitamin D  1,000 Units Oral Daily    sucralfate  1 g Oral 4 times per day    metoprolol tartrate  25 mg Oral BID    sodium chloride flush  10 mL Intravenous 2 times per day    enoxaparin  30 mg Subcutaneous Daily      Infusions:   PRN Meds: calcium carbonate, 500 mg, TID PRN  nitroGLYCERIN, 0.4 mg, Q5 Min PRN  sodium chloride flush, 10 mL, PRN  acetaminophen, 650 mg, Q6H PRN    Or  acetaminophen, 650 mg, Q6H PRN  polyethylene glycol, 17 g, Daily PRN  promethazine, 12.5 mg, Q6H PRN    Or  ondansetron, 4 mg, Q6H PRN  nitroGLYCERIN, 0.4 mg, Q5 Min PRN          Electronically signed by Jennifer Rodriguez MD on 8/15/2020 at 3:40 PM

## 2020-08-15 NOTE — CONSULTS
CARDIOLOGY CONSULT NOTE   Reason for consultation:  Chest pain    Referring physician:  Stefanie Mcmullen MD     Primary care physician: Galen Long MD      Dear  Dr. Stefanie Mcmullen MD   Thanks for the consult. Chief Complaints :  Chief Complaint   Patient presents with    Chest Pain     for couple weeks         History of present illness:Mariposa is a 69 y. o.year old who comes in due to ongoing intermittent chest discomfort for several weeks now she describes it as a constant pressure with intermittent worsening with no clear inciting events. Not particular associated with activity. She describes it as an elephant sitting on the chest with a 2 out of 10 in intensity. Believes her symptoms get worse when she lays down at night during daytime she does not notice much symptoms. She thinks is more to do with increased stressors in life her sister who has Down syndrome patient is taken over responsibility and grandma and stepfather just passed away. She was also worried about her weight due to recent traveling  Did not try any medication  She does have longstanding history of reflux describes it as heartburn in the middle of the chest the pain is nonreproducible no associated shortness of breath mostly epigastric and middle of the chest.    Past medical history:    has a past medical history of Asthma and Hyperlipidemia. Past surgical history:   has a past surgical history that includes Tonsillectomy; Appendectomy; Cholecystectomy; Tubal ligation; Hand surgery; and Breast surgery. Social History:   reports that she has never smoked. She does not have any smokeless tobacco history on file. She reports that she does not drink alcohol or use drugs.   Family history:   no family history of CAD, STROKE of DM at early age    Allergies   Allergen Reactions    Pcn [Penicillins] Hives    Statins      Pain in legs     Sulfa Antibiotics      Leg cramps     Vioxx [Rofecoxib]      Heart palpitation        azelastine (ASTELIN) 0.1 % nasal spray 2 spray, BID  calcium-vitamin D 500-200 MG-UNIT per tablet 1 tablet, Daily  docusate sodium (COLACE) capsule 100 mg, BID  estradiol (ESTRACE) vaginal cream 2 g  ++ NON FORMULARY / PATIENT SUPPLIED ++, Daily  ezetimibe (ZETIA) tablet 10 mg, Daily  fluticasone (FLONASE) 50 MCG/ACT nasal spray 2 spray, Daily  gabapentin (NEURONTIN) capsule 100 mg, TID  montelukast (SINGULAIR) tablet 10 mg, Nightly  therapeutic multivitamin-minerals 1 tablet, Daily  [START ON 8/16/2020] pantoprazole (PROTONIX) tablet 40 mg, QAM AC  sertraline (ZOLOFT) tablet 100 mg, Daily  vitamin D CAPS 1,000 Units, Daily  calcium carbonate (TUMS) chewable tablet 500 mg, TID PRN  sucralfate (CARAFATE) tablet 1 g, 4 times per day  nitroGLYCERIN (NITROSTAT) SL tablet 0.4 mg, Q5 Min PRN  metoprolol tartrate (LOPRESSOR) tablet 25 mg, BID  sodium chloride flush 0.9 % injection 10 mL, 2 times per day  sodium chloride flush 0.9 % injection 10 mL, PRN  acetaminophen (TYLENOL) tablet 650 mg, Q6H PRN    Or  acetaminophen (TYLENOL) suppository 650 mg, Q6H PRN  polyethylene glycol (GLYCOLAX) packet 17 g, Daily PRN  promethazine (PHENERGAN) tablet 12.5 mg, Q6H PRN    Or  ondansetron (ZOFRAN) injection 4 mg, Q6H PRN  enoxaparin (LOVENOX) injection 30 mg, Daily  nitroGLYCERIN (NITROSTAT) SL tablet 0.4 mg, Q5 Min PRN      Current Facility-Administered Medications   Medication Dose Route Frequency Provider Last Rate Last Dose    azelastine (ASTELIN) 0.1 % nasal spray 2 spray  2 spray Each Nostril BID CAROLINA Werner CNP   2 spray at 08/15/20 1022    calcium-vitamin D 500-200 MG-UNIT per tablet 1 tablet  1 tablet Oral Daily CAROLINA Werner CNP   1 tablet at 08/15/20 1024    docusate sodium (COLACE) capsule 100 mg  100 mg Oral BID CAROLINA Werner CNP        estradiol (ESTRACE) vaginal cream 2 g  ++ NON FORMULARY / PATIENT SUPPLIED ++  2 g Vaginal Daily CAROLINA Werner CNP        ezetimibe (ZETIA) tablet 10 mg  10 mg Oral Daily CAROLINA Werner CNP        fluticasone (FLONASE) 50 MCG/ACT nasal spray 2 spray  2 spray Each Nostril Daily CAROLINA Werner CNP   2 spray at 08/15/20 1022    gabapentin (NEURONTIN) capsule 100 mg  100 mg Oral TID CAROLINA Metz CNP   100 mg at 08/15/20 1024    montelukast (SINGULAIR) tablet 10 mg  10 mg Oral Nightly CAROLINA Werner - CNP   10 mg at 08/15/20 0033    therapeutic multivitamin-minerals 1 tablet  1 tablet Oral Daily CAROLINA Werner CNP   1 tablet at 08/15/20 1027    [START ON 8/16/2020] pantoprazole (PROTONIX) tablet 40 mg  40 mg Oral QAM AC CAROLINA Werner - SANCHO        sertraline (ZOLOFT) tablet 100 mg  100 mg Oral Daily CAROLINA Werner - CNP        vitamin D CAPS 1,000 Units  1,000 Units Oral Daily CAROLINA Werner CNP   1,000 Units at 08/15/20 1027    calcium carbonate (TUMS) chewable tablet 500 mg  500 mg Oral TID PRN Tiffani Dominguez APRN - SANCHO        sucralfate (CARAFATE) tablet 1 g  1 g Oral 4 times per day Essence Sarabia MD        nitroGLYCERIN (NITROSTAT) SL tablet 0.4 mg  0.4 mg Sublingual Q5 Min PRN Essence Sarabia MD        metoprolol tartrate (LOPRESSOR) tablet 25 mg  25 mg Oral BID Essence Sarabia MD        sodium chloride flush 0.9 % injection 10 mL  10 mL Intravenous 2 times per day CAROLINA Metz CNP   10 mL at 08/15/20 1029    sodium chloride flush 0.9 % injection 10 mL  10 mL Intravenous PRN CAROLINA Werner - CNP        acetaminophen (TYLENOL) tablet 650 mg  650 mg Oral Q6H PRN CAROLINA Werner - CNP   650 mg at 08/15/20 1024    Or    acetaminophen (TYLENOL) suppository 650 mg  650 mg Rectal Q6H PRN CAROLINA Werner - CNP        polyethylene glycol (GLYCOLAX) packet 17 g  17 g Oral Daily PRN CAROLINA Werner - CNP        promethazine (PHENERGAN) tablet 12.5 mg  12.5 mg Oral Q6H PRN CAROLINA Werner CNP        Or    ondansetron (Zaida Steen) injection 4 mg  4 mg Intravenous Q6H PRN CAROLINA Werner - CNP        enoxaparin (LOVENOX) injection 30 mg  30 mg Subcutaneous Daily CAROLINA Werner CNP   30 mg at 08/15/20 1025    nitroGLYCERIN (NITROSTAT) SL tablet 0.4 mg  0.4 mg Sublingual Q5 Min PRN CAROLINA Werner CNP         Review of Systems:   · Constitutional: No Fever or Weight Loss   · Eyes: No Decreased Vision  · ENT: No Headaches, Hearing Loss or Vertigo  · Cardiovascular: As per HPI  · Respiratory: As per HPI  · Gastrointestinal: No abdominal pain, appetite loss, blood in stools, constipation, diarrhea or heartburn  · Genitourinary: No dysuria, trouble voiding, or hematuria  · Musculoskeletal:  No gait disturbance, weakness or joint complaints  · Integumentary: No rash or pruritis  · Neurological: No TIA or stroke symptoms  · Psychiatric: No anxiety or depression  · Endocrine: No malaise, fatigue or temperature intolerance  · Hematologic/Lymphatic: No bleeding problems, blood clots or swollen lymph nodes  · Allergic/Immunologic: No nasal congestion or hives  All systems negative except as marked. Physical Examination:    Vitals:    08/14/20 2049 08/14/20 2225 08/15/20 0310 08/15/20 1007   BP: 125/71 128/81 (!) 98/56 (!) 109/58   Pulse: 73 63 63 58   Resp: 16 16 12 19   Temp:  97.7 °F (36.5 °C) 97.7 °F (36.5 °C) 97.8 °F (36.6 °C)   TempSrc:  Oral Oral Oral   SpO2: 97% 97% 94% 95%   Weight:  152 lb 14.4 oz (69.4 kg) 154 lb (69.9 kg)    Height:  5' 5\" (1.651 m)         General Appearance:  No distress, conversant    Constitutional:  Well developed, Well nourished, No acute distress, Non-toxic appearance. HENT:  Normocephalic, Atraumatic, Bilateral external ears normal, Oropharynx moist, No oral exudates, Nose normal. Neck- Normal range of motion, No tenderness, Supple, No stridor,no apical-carotid delay  Lymphatics : no palpable lymph nodes  Eyes:  PERRL, EOMI, Conjunctiva normal, No discharge.    Respiratory:  Normal

## 2020-08-15 NOTE — ED NOTES
Bedside report given to [facility],  Patient taken to ambulance for transport     [address], [facility]  08/14/20 0949

## 2020-08-15 NOTE — H&P
HISTORY AND PHYSICAL  (Hospitalist, Internal Medicine)  IDENTIFYING INFORMATION   PATIENT:  Puneet García  MRN:  [id number]  ADMIT DATE: 8/14/2020  TIME OF EVALUATION: 8/14/2020 11:05 PM    CHIEF COMPLAINT     Midsternal to epigastric pain x3wks    HISTORY OF PRESENT ILLNESS   Puneet García is a 69 y.o. female with a past medical history of AARON, hyperlipidemia, asthma and GERD. She presents as a direct admit from [facility] ER. The patient presented to the ER with complaints of worsening sternal/mid epigastric pain/pressure x3wks. she describes it as \"I feel like an elephant is sitting on my chest\" with palpitations. Was initially relieved by rest but now without relieving measures and does not radiate. She denies any associated cough, SOB and fever. She denies recent stressors/anxiety, strenuous activity, smoking and  illicit drug use. Patient reports she has had same episode 10 years ago and was told she has costochondritis at that time. Reports recent travel to [place] last week for her daughter's graduation party. Vitals stable on ED presentation. EKG showed normal sinus rhythm and without ischemic changes. Chest x-ray nonacute. CBC BMP unremarkable. Initial troponin negative. Patient transferred to [address] for further cardiac work-up. At time of this assessment, patient lying in bed, on room air, still endorses 8/10 mid epigastric pain but objects nitro and morphine/Percocet offered. She denies fever, chills, shortness of breath, nausea,  Vomiting,  diarrhea and constipation. PMH listed below:    PAST MEDICAL, SURGICAL, FAMILY, and SOCIAL HISTORY     Past Medical History:   Diagnosis Date    Asthma     Hyperlipidemia      Past Surgical History:   Procedure Laterality Date    APPENDECTOMY      BREAST SURGERY      CHOLECYSTECTOMY      HAND SURGERY      TONSILLECTOMY      TUBAL LIGATION       History reviewed. No pertinent family history.   Family Hx of HTN  Family Hx as reviewed above, otherwise non-contributory  Social History     Socioeconomic History    Marital status:      Spouse name: None    Number of children: None    Years of education: None    Highest education level: None   Occupational History    None   Social Needs    Financial resource strain: None    Food insecurity     Worry: None     Inability: None    Transportation needs     Medical: None     Non-medical: None   Tobacco Use    Smoking status: Never Smoker   Substance and Sexual Activity    Alcohol use: Never    Drug use: Never    Sexual activity: Never   Lifestyle    Physical activity     Days per week: None     Minutes per session: None    Stress: None   Relationships    Social connections     Talks on phone: None     Gets together: None     Attends Yazidism service: None     Active member of club or organization: None     Attends meetings of clubs or organizations: None     Relationship status: None    Intimate partner violence     Fear of current or ex partner: None     Emotionally abused: None     Physically abused: None     Forced sexual activity: None   Other Topics Concern    None   Social History Narrative    None       MEDICATIONS   Medications Prior to Admission reviewed with the patient  No medications prior to admission.     Current Medications  Current Facility-Administered Medications   Medication Dose Route Frequency Provider Last Rate Last Dose    sodium chloride flush 0.9 % injection 10 mL  10 mL Intravenous 2 times per day CAROLINA Werner CNP        sodium chloride flush 0.9 % injection 10 mL  10 mL Intravenous PRN CAROLINA Werner CNP        acetaminophen (TYLENOL) tablet 650 mg  650 mg Oral Q6H PRN CAROLINA Werner CNP        Or    acetaminophen (TYLENOL) suppository 650 mg  650 mg Rectal Q6H PRN CAROLINA Werner CNP        polyethylene glycol (GLYCOLAX) packet 17 g  17 g Oral Daily PRN CAROLINA Werner CNP        promethazine (PHENERGAN) tablet 12.5 mg  12.5 mg Oral Q6H PRN Tiffani Dominguez, APRN - CNP        Or    ondansetron (ZOFRAN) injection 4 mg  4 mg Intravenous Q6H PRN Tiffani Mickeyh, APRN - CNP        metoprolol tartrate (LOPRESSOR) tablet 25 mg  25 mg Oral BID Tiffaniallan Dominguez, APRN - CNP        [START ON 8/15/2020] enoxaparin (LOVENOX) injection 40 mg  40 mg Subcutaneous Daily Tiffani Dominguez APRN - CNP        nitroGLYCERIN (NITROSTAT) SL tablet 0.4 mg  0.4 mg Sublingual Q5 Min PRN Tiffani Mickeyh, APRN - CNP             Allergies  Allergies   Allergen Reactions    Pcn [Penicillins] Hives    Statins      Pain in legs     Sulfa Antibiotics      Leg cramps     Vioxx [Rofecoxib]      Heart palpitation        REVIEW OF SYSTEMS   Within above limitations. 14 point review of systems reviewed. Pertinent positive or negative as per HPI or otherwise negative per 14 point systems review. PHYSICAL EXAM     Wt Readings from Last 3 Encounters:   08/14/20 152 lb 14.4 oz (69.4 kg)       Blood pressure 128/81, pulse 63, temperature 97.7 °F (36.5 °C), temperature source Oral, resp. rate 16, height 5' 5\" (1.651 m), weight 152 lb 14.4 oz (69.4 kg), SpO2 97 %. General - AAO x 3  Psych - Appropriate affect/speech. No agitation  Eyes - Eye lids intact. No scleral icterus  ENT - Lips wnl. External ear clear/dry/intact. No thyromegaly on inspection  Neuro - No gross peripheral or central neuro deficits on inspection  Heart - Sinus. RRR. S1 and S2 present. No added HS/murmurs appreciated. No elevated JVD appreciated  Lung - Adequate air entry b/l, No crackles/wheezes appreciated  GI - Soft. No guarding/rigidity. No hepatosplenomegaly/ascites. BS+   - No CVA/suprapubic tenderness or palpable bladder distension  Skin - Intact. No rash/petechiae/ecchymosis. Warm extremities  MSK - Joints with normal ROM.  No joint swellings      LABS AND IMAGING   CBC  [unfilled]    Last 3 Hemoglobin  Lab Results   Component Value Date    HGB 14.5 08/14/2020     Last 3 WBC/ANC  Lab Results   Component Value Date    WBC 6.0 08/14/2020     No components found for: GRNLOCTYABS  Last 3 Platelets  No results found for: PLATELET  Chemistry  [unfilled]  [unfilled]  No results found for: LDH  Coagulation Studies  No results found for: PTT, INR  Liver Function Studies  Lab Results   Component Value Date    ALT 12 08/14/2020    AST 21 08/14/2020    ALKPHOS 63 08/14/2020       Recent Imaging    :    ONE XRAY VIEW OF THE CHEST         8/14/2020 2:23 pm         COMPARISON:    December 24, 2008         HISTORY:    ORDERING SYSTEM PROVIDED HISTORY: chest pain    TECHNOLOGIST PROVIDED HISTORY:    Reason for exam:->chest pain         FINDINGS:    The lungs are without acute focal process.  There is no effusion or    pneumothorax. The cardiomediastinal silhouette is without acute process. The    osseous structures are without acute process.              Impression    No acute process.               Relevant labs and imaging reviewed    ASSESSMENT AND PLAN     Carolyn Servin is a 65-year-old female directly admitted from [facility] ER for evaluation of chest pain. A/P as follows; Chest pain/ ACS R/O.  DDX: Acute PE, anxiety attack, GERD, costochondritis. EKG and CXR non-acute. Initial troponin and d-dimer normal. Hx of still costochondritis 10 years ago.    -Admit to [facility] with telemetry  -Serial troponin  -Nitro PRN  -Cardiology consult  -Reports allergy to ASA, statin.  -Stat BB  -N.p.o. at midnight for cardiology eval in a.m.  -Protonix/tums  -Echo  -EKG in a.m.       Chronic medical conditions;  AARON-Zoloft  Chronic occipital headaches-gabapentin  Asthma-Singulair,  Hyperlipidemia  GERD-Protonix      -DVT Prophylaxis: lovenox    -GI Prophylaxis: protonix        Case d/w  Attending Dr. Eduardo Pablo, CNP  8/14/2020 at 11:05 PM

## 2020-08-16 LAB
ANION GAP SERPL CALCULATED.3IONS-SCNC: 8 MMOL/L (ref 4–16)
BUN BLDV-MCNC: 23 MG/DL (ref 6–23)
CALCIUM SERPL-MCNC: 9.4 MG/DL (ref 8.3–10.6)
CHLORIDE BLD-SCNC: 106 MMOL/L (ref 99–110)
CHOLESTEROL: 190 MG/DL
CO2: 30 MMOL/L (ref 21–32)
CREAT SERPL-MCNC: 1.2 MG/DL (ref 0.6–1.1)
ESTIMATED AVERAGE GLUCOSE: 100 MG/DL
GFR AFRICAN AMERICAN: 54 ML/MIN/1.73M2
GFR NON-AFRICAN AMERICAN: 44 ML/MIN/1.73M2
GLUCOSE BLD-MCNC: 99 MG/DL (ref 70–99)
HBA1C MFR BLD: 5.1 % (ref 4.2–6.3)
HDLC SERPL-MCNC: 45 MG/DL
LDL CHOLESTEROL DIRECT: 123 MG/DL
POTASSIUM SERPL-SCNC: 4.2 MMOL/L (ref 3.5–5.1)
SODIUM BLD-SCNC: 144 MMOL/L (ref 135–145)
TRIGL SERPL-MCNC: 187 MG/DL

## 2020-08-16 PROCEDURE — 6370000000 HC RX 637 (ALT 250 FOR IP): Performed by: NURSE PRACTITIONER

## 2020-08-16 PROCEDURE — 80048 BASIC METABOLIC PNL TOTAL CA: CPT

## 2020-08-16 PROCEDURE — 83036 HEMOGLOBIN GLYCOSYLATED A1C: CPT

## 2020-08-16 PROCEDURE — 36415 COLL VENOUS BLD VENIPUNCTURE: CPT

## 2020-08-16 PROCEDURE — 99214 OFFICE O/P EST MOD 30 MIN: CPT | Performed by: INTERNAL MEDICINE

## 2020-08-16 PROCEDURE — 2580000003 HC RX 258: Performed by: INTERNAL MEDICINE

## 2020-08-16 PROCEDURE — APPSS30 APP SPLIT SHARED TIME 16-30 MINUTES: Performed by: NURSE PRACTITIONER

## 2020-08-16 PROCEDURE — 80061 LIPID PANEL: CPT

## 2020-08-16 PROCEDURE — 2580000003 HC RX 258: Performed by: NURSE PRACTITIONER

## 2020-08-16 PROCEDURE — 96372 THER/PROPH/DIAG INJ SC/IM: CPT

## 2020-08-16 PROCEDURE — 6370000000 HC RX 637 (ALT 250 FOR IP): Performed by: INTERNAL MEDICINE

## 2020-08-16 PROCEDURE — 83721 ASSAY OF BLOOD LIPOPROTEIN: CPT

## 2020-08-16 PROCEDURE — 6360000002 HC RX W HCPCS: Performed by: NURSE PRACTITIONER

## 2020-08-16 PROCEDURE — G0378 HOSPITAL OBSERVATION PER HR: HCPCS

## 2020-08-16 RX ORDER — SODIUM CHLORIDE 9 MG/ML
INJECTION, SOLUTION INTRAVENOUS CONTINUOUS
Status: DISCONTINUED | OUTPATIENT
Start: 2020-08-16 | End: 2020-08-17 | Stop reason: HOSPADM

## 2020-08-16 RX ADMIN — SUCRALFATE 1 G: 1 TABLET ORAL at 06:48

## 2020-08-16 RX ADMIN — MONTELUKAST 10 MG: 10 TABLET, FILM COATED ORAL at 21:05

## 2020-08-16 RX ADMIN — AZELASTINE 2 SPRAY: 137 SPRAY, METERED NASAL at 10:36

## 2020-08-16 RX ADMIN — GABAPENTIN 100 MG: 100 CAPSULE ORAL at 21:05

## 2020-08-16 RX ADMIN — NITROGLYCERIN 0.4 MG: 0.4 TABLET SUBLINGUAL at 10:37

## 2020-08-16 RX ADMIN — SODIUM CHLORIDE: 9 INJECTION, SOLUTION INTRAVENOUS at 17:50

## 2020-08-16 RX ADMIN — GABAPENTIN 100 MG: 100 CAPSULE ORAL at 10:36

## 2020-08-16 RX ADMIN — ACETAMINOPHEN 650 MG: 325 TABLET ORAL at 21:18

## 2020-08-16 RX ADMIN — SUCRALFATE 1 G: 1 TABLET ORAL at 13:48

## 2020-08-16 RX ADMIN — SODIUM CHLORIDE, PRESERVATIVE FREE 10 ML: 5 INJECTION INTRAVENOUS at 21:06

## 2020-08-16 RX ADMIN — GABAPENTIN 100 MG: 100 CAPSULE ORAL at 13:48

## 2020-08-16 RX ADMIN — SUCRALFATE 1 G: 1 TABLET ORAL at 17:50

## 2020-08-16 RX ADMIN — PANTOPRAZOLE SODIUM 40 MG: 40 TABLET, DELAYED RELEASE ORAL at 06:48

## 2020-08-16 RX ADMIN — Medication 1000 UNITS: at 10:36

## 2020-08-16 RX ADMIN — FLUTICASONE PROPIONATE 2 SPRAY: 50 SPRAY, METERED NASAL at 10:36

## 2020-08-16 RX ADMIN — ENOXAPARIN SODIUM 30 MG: 30 INJECTION SUBCUTANEOUS at 10:36

## 2020-08-16 RX ADMIN — EZETIMIBE 10 MG: 10 TABLET ORAL at 21:05

## 2020-08-16 RX ADMIN — METOPROLOL TARTRATE 25 MG: 25 TABLET, FILM COATED ORAL at 10:36

## 2020-08-16 RX ADMIN — SERTRALINE HYDROCHLORIDE 100 MG: 100 TABLET ORAL at 21:06

## 2020-08-16 RX ADMIN — METOPROLOL TARTRATE 25 MG: 25 TABLET, FILM COATED ORAL at 21:05

## 2020-08-16 RX ADMIN — OYSTER SHELL CALCIUM WITH VITAMIN D 1 TABLET: 500; 200 TABLET, FILM COATED ORAL at 10:36

## 2020-08-16 RX ADMIN — MULTIPLE VITAMINS W/ MINERALS TAB 1 TABLET: TAB at 10:36

## 2020-08-16 ASSESSMENT — PAIN SCALES - GENERAL
PAINLEVEL_OUTOF10: 0
PAINLEVEL_OUTOF10: 0
PAINLEVEL_OUTOF10: 4
PAINLEVEL_OUTOF10: 7

## 2020-08-16 ASSESSMENT — PAIN DESCRIPTION - ORIENTATION: ORIENTATION: LEFT;MID

## 2020-08-16 ASSESSMENT — PAIN DESCRIPTION - DESCRIPTORS: DESCRIPTORS: PRESSURE

## 2020-08-16 ASSESSMENT — PAIN DESCRIPTION - ONSET: ONSET: ON-GOING

## 2020-08-16 ASSESSMENT — PAIN DESCRIPTION - PAIN TYPE: TYPE: ACUTE PAIN

## 2020-08-16 ASSESSMENT — PAIN DESCRIPTION - LOCATION: LOCATION: CHEST

## 2020-08-16 ASSESSMENT — PAIN DESCRIPTION - FREQUENCY: FREQUENCY: INTERMITTENT

## 2020-08-16 NOTE — PROGRESS NOTES
[address]    Hospitalist Progress Note      Name:  Hellen Lopez /Age/Sex: 1950  (69 y.o. female)   MRN & CSN:  [id number] & [id number] Admission Date/Time: 2020  2:25 PM   Location:  [address] [place] [address]- PCP: Adalberto Runner, MD         Hospital Day: 3    Assessment and Plan:   Hellen Lopez is a 69 y.o.  female  who presents with chest pain    Chest pain rule out ACS    Troponin were all negative, EKG with no ST changes  Consulted cardiology with a plan for stress test  Check hemoglobin A1c, lipid profile  Continue with Zetia, ASA, Lopressor    ,  GERD -continue with Protonix and sucralfate    Hypertension -well controlled with Lopressor mostly on the lower side    Depression -continue with Zoloft  ,   Diet DIET CARDIAC; Diet NPO, After Midnight   DVT Prophylaxis [] Lovenox, []  Heparin, [] SCDs, []No VTE prophylaxis, patient ambulating   GI Prophylaxis [] PPI, [] H2 Blocker, [] No GI prophylaxis, patient is receiving diet/Tube Feeds   Code Status Full Code   Disposition Patient requires continued admission due to chest pain   MDM [] Low, [] Moderate,[]  High     History of Present Illness: Subjective     Patient Seen & Examined at the bedside      Patient is resting in bed with no distress while on room air -   states that Nitro makes her chest pain less severe   Continues to have intermittent pressure-like midsternal chest pain      Ten point ROS reviewed negative, unless as noted above    Objective:     No intake or output data in the 24 hours ending 20 1347   Vitals:   Vitals:    20 1033   BP: 116/65   Pulse: 60   Resp: 13   Temp: 98.1 °F (36.7 °C)   SpO2: 96%     Physical Exam:    GEN Awake female, resting in bed in no apparent distress. Appears given age. HENT Mucous membranes are moist.   RESP Clear to auscultation, no wheezes, rales or rhonchi. CARDIO/VASC -S1/S2 auscultated. Regular rate without appreciable murmurs, rubs, or gallops.  Peripheral pulses equal bilaterally and palpable. No peripheral edema. GI Abdomen is soft without significant tenderness, masses, or guarding. Bowel sounds are normoactive. Rectal exam deferred.  Garcias catheter is not present.     Medications:   Medications:    azelastine  2 spray Each Nostril BID    calcium-vitamin D  1 tablet Oral Daily    docusate sodium  100 mg Oral BID    estradiol  2 g Vaginal Daily    ezetimibe  10 mg Oral Daily    fluticasone  2 spray Each Nostril Daily    gabapentin  100 mg Oral TID    montelukast  10 mg Oral Nightly    therapeutic multivitamin-minerals  1 tablet Oral Daily    pantoprazole  40 mg Oral QAM AC    sertraline  100 mg Oral Daily    vitamin D  1,000 Units Oral Daily    sucralfate  1 g Oral 4 times per day    metoprolol tartrate  25 mg Oral BID    sodium chloride flush  10 mL Intravenous 2 times per day    enoxaparin  30 mg Subcutaneous Daily      Infusions:    sodium chloride       PRN Meds: calcium carbonate, 500 mg, TID PRN  nitroGLYCERIN, 0.4 mg, Q5 Min PRN  sodium chloride flush, 10 mL, PRN  acetaminophen, 650 mg, Q6H PRN    Or  acetaminophen, 650 mg, Q6H PRN  polyethylene glycol, 17 g, Daily PRN  promethazine, 12.5 mg, Q6H PRN    Or  ondansetron, 4 mg, Q6H PRN  nitroGLYCERIN, 0.4 mg, Q5 Min PRN          Electronically signed by Dwayne Rojas MD on 8/16/2020 at 1:47 PM

## 2020-08-16 NOTE — PROGRESS NOTES
Cardiology Progress Note     Today's Plan we will plan for stress test and echo  in a.m. Admit Date:  8/14/2020    Consult reason/ Seen today for: chest pain    Subjective and  Overnight Events: Continues to have intermittent chest pain. Pain noted in epigastric region states improved yesterday however this morning feels like it has returned. Assessment / Plan / Recommendation:     1. Chest pain:  trend troponin are negative x3- Check NM stress and echocardiogram in a.m.-  Further treatment and testing pending clinical course. Will continue with BB -can use nitroglycerin as needed  2. Hyperlipidemia -continue Zetia  3. GERD- on PPI   4. DVT prophylaxis if no contraindications      History of Presenting Illness:    Chief complain on admission : 69 y. o.year old who is admitted for  Chief Complaint   Patient presents with    Chest Pain     for couple weeks         Past medical history:    has a past medical history of Asthma and Hyperlipidemia. Past surgical history:   has a past surgical history that includes Tonsillectomy; Appendectomy; Cholecystectomy; Tubal ligation; Hand surgery; and Breast surgery. Social History:   reports that she has never smoked. She does not have any smokeless tobacco history on file. She reports that she does not drink alcohol or use drugs. Family history:  family history is not on file.     Allergies   Allergen Reactions    Pcn [Penicillins] Hives    Statins      Pain in legs     Sulfa Antibiotics      Leg cramps     Vioxx [Rofecoxib]      Heart palpitation        Review of Systems:   All 14 systems were reviewed and are negative  Except for the positive findings which are documented     /65   Pulse 60   Temp 98.1 °F (36.7 °C) (Oral)   Resp 13   Ht 5' 5\" (1.651 m)   Wt 154 lb (69.9 kg)   SpO2 96%   BMI 25.63 kg/m²   No intake or output data in the 24 hours ending 08/16/20 1211    Physical Exam:  Constitutional:  Well developed, Well nourished, No acute distress  HENT:  Normocephalic, Atraumatic, Bilateral external ears normal,  Nose normal.   Neck- trachea is midline  Eyes:  PEERL, Conjunctiva normal  Respiratory:  Normal breath sounds, No respiratory distress, No wheezing, No chest tenderness. Cardiovascular:  Normal heart rate, Normal rhythm, no murmurs appreciated, No rubs appreciated, No gallops appreciated, JVP not elevated  Abdomen/GI:  Soft, No tenderness  Musculoskeletal:  Intact distal pulses, no edema to lower legs,  No tenderness, No cyanosis, No clubbing. Integument:  Warm, Dry  Lymphatic:  No lymphadenopathy noted.    Neurologic:  Alert & oriented  Psychiatric:  Affect and Mood :pleasant     Medications:    azelastine  2 spray Each Nostril BID    calcium-vitamin D  1 tablet Oral Daily    docusate sodium  100 mg Oral BID    estradiol  2 g Vaginal Daily    ezetimibe  10 mg Oral Daily    fluticasone  2 spray Each Nostril Daily    gabapentin  100 mg Oral TID    montelukast  10 mg Oral Nightly    therapeutic multivitamin-minerals  1 tablet Oral Daily    pantoprazole  40 mg Oral QAM AC    sertraline  100 mg Oral Daily    vitamin D  1,000 Units Oral Daily    sucralfate  1 g Oral 4 times per day    metoprolol tartrate  25 mg Oral BID    sodium chloride flush  10 mL Intravenous 2 times per day    enoxaparin  30 mg Subcutaneous Daily       calcium carbonate, nitroGLYCERIN, sodium chloride flush, acetaminophen **OR** acetaminophen, polyethylene glycol, promethazine **OR** ondansetron, nitroGLYCERIN    Lab Data:  CBC:   Recent Labs     08/14/20  1420 08/14/20  2326 08/15/20  0359   WBC 6.0 6.9 6.6   HGB 14.5 14.5 13.6   HCT 43.7 45.0 42.5   MCV 90.9 92.2 93.2    156 158     BMP:   Recent Labs     08/14/20  2326 08/15/20  0359 08/16/20  0354    143 144   K 4.3 4.1 4.2    108 106   CO2 25 27 30   BUN 22 24* 23   CREATININE 0.9 1.0 1.2*     PT/INR: above plan, which was planned by myself and discussed with NP.     Stress test in am    Chica Cherry MD [facility] [place] 08/16/20

## 2020-08-17 ENCOUNTER — APPOINTMENT (OUTPATIENT)
Dept: NUCLEAR MEDICINE | Age: 70
End: 2020-08-17
Payer: MEDICARE

## 2020-08-17 VITALS
DIASTOLIC BLOOD PRESSURE: 65 MMHG | HEIGHT: 65 IN | SYSTOLIC BLOOD PRESSURE: 100 MMHG | OXYGEN SATURATION: 97 % | TEMPERATURE: 97.7 F | WEIGHT: 157 LBS | RESPIRATION RATE: 11 BRPM | HEART RATE: 58 BPM | BODY MASS INDEX: 26.16 KG/M2

## 2020-08-17 LAB
LV EF: 53 %
LV EF: 74 %
LVEF MODALITY: NORMAL
LVEF MODALITY: NORMAL

## 2020-08-17 PROCEDURE — G0378 HOSPITAL OBSERVATION PER HR: HCPCS

## 2020-08-17 PROCEDURE — 6370000000 HC RX 637 (ALT 250 FOR IP): Performed by: NURSE PRACTITIONER

## 2020-08-17 PROCEDURE — 93306 TTE W/DOPPLER COMPLETE: CPT

## 2020-08-17 PROCEDURE — 6370000000 HC RX 637 (ALT 250 FOR IP): Performed by: INTERNAL MEDICINE

## 2020-08-17 PROCEDURE — 3430000000 HC RX DIAGNOSTIC RADIOPHARMACEUTICAL: Performed by: INTERNAL MEDICINE

## 2020-08-17 PROCEDURE — 2580000003 HC RX 258: Performed by: NURSE PRACTITIONER

## 2020-08-17 PROCEDURE — APPSS60 APP SPLIT SHARED TIME 46-60 MINUTES: Performed by: NURSE PRACTITIONER

## 2020-08-17 PROCEDURE — 94761 N-INVAS EAR/PLS OXIMETRY MLT: CPT

## 2020-08-17 PROCEDURE — 78452 HT MUSCLE IMAGE SPECT MULT: CPT

## 2020-08-17 PROCEDURE — 99213 OFFICE O/P EST LOW 20 MIN: CPT | Performed by: INTERNAL MEDICINE

## 2020-08-17 PROCEDURE — 93017 CV STRESS TEST TRACING ONLY: CPT

## 2020-08-17 PROCEDURE — A9500 TC99M SESTAMIBI: HCPCS | Performed by: INTERNAL MEDICINE

## 2020-08-17 PROCEDURE — 2580000003 HC RX 258: Performed by: INTERNAL MEDICINE

## 2020-08-17 RX ORDER — SUCRALFATE 1 G/1
1 TABLET ORAL 4 TIMES DAILY
Qty: 120 TABLET | Refills: 3 | Status: SHIPPED | OUTPATIENT
Start: 2020-08-17

## 2020-08-17 RX ORDER — NITROGLYCERIN 0.4 MG/1
TABLET SUBLINGUAL
Qty: 25 TABLET | Refills: 3 | Status: SHIPPED | OUTPATIENT
Start: 2020-08-17

## 2020-08-17 RX ADMIN — SUCRALFATE 1 G: 1 TABLET ORAL at 06:13

## 2020-08-17 RX ADMIN — Medication 10 MILLICURIE: at 08:15

## 2020-08-17 RX ADMIN — GABAPENTIN 100 MG: 100 CAPSULE ORAL at 14:13

## 2020-08-17 RX ADMIN — PANTOPRAZOLE SODIUM 40 MG: 40 TABLET, DELAYED RELEASE ORAL at 06:13

## 2020-08-17 RX ADMIN — Medication 1000 UNITS: at 14:13

## 2020-08-17 RX ADMIN — SODIUM CHLORIDE, PRESERVATIVE FREE 10 ML: 5 INJECTION INTRAVENOUS at 14:09

## 2020-08-17 RX ADMIN — SODIUM CHLORIDE: 9 INJECTION, SOLUTION INTRAVENOUS at 14:09

## 2020-08-17 RX ADMIN — SUCRALFATE 1 G: 1 TABLET ORAL at 14:14

## 2020-08-17 RX ADMIN — MULTIPLE VITAMINS W/ MINERALS TAB 1 TABLET: TAB at 14:14

## 2020-08-17 RX ADMIN — Medication 30 MILLICURIE: at 11:30

## 2020-08-17 RX ADMIN — OYSTER SHELL CALCIUM WITH VITAMIN D 1 TABLET: 500; 200 TABLET, FILM COATED ORAL at 14:13

## 2020-08-17 ASSESSMENT — PAIN DESCRIPTION - PROGRESSION: CLINICAL_PROGRESSION: NOT CHANGED

## 2020-08-17 ASSESSMENT — PAIN DESCRIPTION - FREQUENCY: FREQUENCY: INTERMITTENT

## 2020-08-17 ASSESSMENT — PAIN SCALES - GENERAL
PAINLEVEL_OUTOF10: 0
PAINLEVEL_OUTOF10: 0
PAINLEVEL_OUTOF10: 7
PAINLEVEL_OUTOF10: 0

## 2020-08-17 ASSESSMENT — PAIN DESCRIPTION - ONSET: ONSET: ON-GOING

## 2020-08-17 ASSESSMENT — PAIN DESCRIPTION - LOCATION: LOCATION: CHEST

## 2020-08-17 ASSESSMENT — PAIN DESCRIPTION - DESCRIPTORS: DESCRIPTORS: PRESSURE;DISCOMFORT

## 2020-08-17 ASSESSMENT — PAIN DESCRIPTION - ORIENTATION: ORIENTATION: MID

## 2020-08-17 ASSESSMENT — PAIN DESCRIPTION - PAIN TYPE: TYPE: ACUTE PAIN

## 2020-08-17 NOTE — PLAN OF CARE
Problem: Pain:  Goal: Pain level will decrease  Description: Pain level will decrease  8/17/2020 1123 by Kittie Buerger, RN  Outcome: Ongoing  0/23/0904 5369 by Kendra Peters RN  Outcome: Ongoing  Goal: Control of acute pain  Description: Control of acute pain  8/17/2020 1123 by Kittie Buerger, RN  Outcome: Ongoing  2/06/5132 0871 by Kendra Peters RN  Outcome: Ongoing  Goal: Control of chronic pain  Description: Control of chronic pain  8/17/2020 1123 by Kittie Buerger, RN  Outcome: Ongoing  6/28/1952 7484 by Kendra Peters RN  Outcome: Ongoing

## 2020-08-17 NOTE — DISCHARGE SUMMARY
[address] Hospitalist     Discharge Summary    Name:  Brent Borrero /Age/Sex: 1950  (69 y.o. female)   MRN & CSN:  [id number] & [id number] Admission Date/Time: 2020  2:25 PM   Attending:  Natalya Rivera MD Discharging Physician: Natalya Rivera MD     HPI:     Please, see admission HPI in [address] and patient's hospital course below    Hospital Course:   Brent Borrero is a 69 y.o.  female  who presents with chest pain and the following assessments reflect patient's hospital course      Chest pain      Troponin were all negative, EKG with no ST changes  ACS was ruled out with normal stress test and negative troponin  ECHO reported with LVEF of 50 to 55% with no reported wall motion abnormality  Continue with Zetia, ASA, Lopressor    ,  GERD -continue with Protonix and sucralfate     Hypertension -well controlled with Lopressor mostly on the lower side     Depression - continue with Zoloft    Patient is hemodynamically stable for DC to Home     The patient/family expressed appropriate understanding of and agreement with the discharge recommendations, medications, and plan.      Consults this admission:  IP CONSULT TO HOSPITALIST  IP CONSULT TO CARDIOLOGY    Discharge Instruction:   Follow up appointments:   Primary care physician:  within 1 to 2 weeks    Diet:  cardiac diet   Activity: activity as tolerated  Disposition: Discharged to:   [x]Home, []C, []SNF, []Acute Rehab, []Hospice   Condition on discharge: Stable    Discharge Medications:      [facility] Downy   Home Medication Instructions NIESHA:[id number]    Printed on:20 6245   Medication Information                      azelastine (ASTELIN) 0.1 % nasal spray  1 spray by Nasal route 2 times daily Use in each nostril as directed             Calcium Carbonate-Vit D-Min (CALCIUM 1200 PO)  Take 1,200 mg by mouth daily             docusate sodium (COLACE) 100 MG capsule  Take 100 mg by mouth 2 times daily             estradiol (ESTRACE) 0.1 MG/GM vaginal cream  Place 2 g vaginally daily             ezetimibe (ZETIA) 10 MG tablet  Take 10 mg by mouth daily             fluticasone (FLONASE) 50 MCG/ACT nasal spray  2 sprays by Each Nostril route daily             gabapentin (NEURONTIN) 100 MG capsule  Take 100 mg by mouth 3 times daily. metoprolol tartrate (LOPRESSOR) 25 MG tablet  Take 1 tablet by mouth 2 times daily             montelukast (SINGULAIR) 10 MG tablet  Take 10 mg by mouth nightly             Multiple Vitamins-Minerals (THERAPEUTIC MULTIVITAMIN-MINERALS) tablet  Take 1 tablet by mouth daily             nitroGLYCERIN (NITROSTAT) 0.4 MG SL tablet  up to max of 3 total doses. If no relief after 1 dose, call 911. pantoprazole sodium (PROTONIX) 40 MG PACK packet  Take 40 mg by mouth every morning (before breakfast)             sertraline (ZOLOFT) 100 MG tablet  Take 100 mg by mouth daily             sucralfate (CARAFATE) 1 GM tablet  Take 1 tablet by mouth 4 times daily             vitamin D3 (CHOLECALCIFEROL) 10 MCG (400 UNIT) TABS tablet  Take 1,000 Units by mouth daily                 Subjective _patient is resting in bed with no distress while on room air -her  is at the bedside  Discussed test results  Advised her that she may have to return back to the ED if chest pain recurs and gets worse and especially is associated with syncopal feeling or shortness of breath or diaphoresis    Objective Findings at Discharge:   /65   Pulse 58   Temp 97.7 °F (36.5 °C) (Oral)   Resp 11   Ht 5' 5\" (1.651 m)   Wt 157 lb (71.2 kg)   SpO2 97%   BMI 26.13 kg/m²            PHYSICAL EXAM   GEN Awake female, resting in bed in no apparent distress. Appears given age. RESP Clear to auscultation, no wheezes, rales or rhonchi. CARDIO/VAS - S1/S2 auscultated. Regular rate without appreciable murmurs, rubs, or gallops. Peripheral pulses equal bilaterally and palpable. No peripheral edema.   GI Abdomen is soft without significant tenderness, masses, or guarding. Bowel sounds are normoactive  MSK No gross joint deformities. Spontaneous movement of all extremities  SKIN Normal coloration, warm, dry. NEURO Cranial nerves appear grossly intact, normal speech, no lateralizing weakness.     BMP/CBC  Recent Labs     08/14/20  2326 08/15/20  0359 08/16/20  0354    143 144   K 4.3 4.1 4.2    108 106   CO2 25 27 30   BUN 22 24* 23   CREATININE 0.9 1.0 1.2*   WBC 6.9 6.6  --    HCT 45.0 42.5  --     158  --          Discharge Time of 33 minutes    Electronically signed by Amaris Ni MD on 8/17/2020 at 3:58 PM

## 2020-08-17 NOTE — PROGRESS NOTES
Cardiology Progress Note     Today's Plan : Stress test and echocardiogram today    Admit Date:  8/14/2020    Consult reason/ Seen today for: chest pain    Subjective and  Overnight Events: Patient states that she is still having some pressures or discomfort in her chest.  She rates the pain 7 out of 10. Pain is worse with deep inspiration. Pain was improved with pain medication     Assessment / Plan / Recommendation:     1. Chest pain:  trend troponin are negative x3.-  Further treatment and testing pending clinical course. Will continue with BB -can use nitroglycerin as needed. Stress test and echocardiogram this morning showed no ischemia normal EF  2. Hyperlipidemia -continue Zetia lipid panel 8/16/2020 , HDL 45, triglycerides 187. Patient has not been able to tolerate statin therapy due to pain. Recommend patient discuss PCSK9 inhibitor as outpatient. 3. GERD- on PPI  4. DVT prophylaxis if no contraindications      History of Presenting Illness:    Chief complain on admission : 69 y. o.year old who is admitted for  Chief Complaint   Patient presents with    Chest Pain     for couple weeks         Past medical history:    has a past medical history of Asthma and Hyperlipidemia. Past surgical history:   has a past surgical history that includes Tonsillectomy; Appendectomy; Cholecystectomy; Tubal ligation; Hand surgery; and Breast surgery. Social History:   reports that she has never smoked. She does not have any smokeless tobacco history on file. She reports that she does not drink alcohol or use drugs. Family history:  family history is not on file.     Allergies   Allergen Reactions    Pcn [Penicillins] Hives    Statins      Pain in legs     Sulfa Antibiotics      Leg cramps     Vioxx [Rofecoxib]      Heart palpitation        Review of Systems:   All 14 systems were reviewed and are negative  Except for the 25 mg Oral BID    sodium chloride flush  10 mL Intravenous 2 times per day    enoxaparin  30 mg Subcutaneous Daily      sodium chloride 75 mL/hr at 08/16/20 1750     technetium sestamibi, technetium sestamibi, calcium carbonate, nitroGLYCERIN, sodium chloride flush, acetaminophen **OR** acetaminophen, polyethylene glycol, promethazine **OR** ondansetron, nitroGLYCERIN    Lab Data:  CBC:   Recent Labs     08/14/20  1420 08/14/20  2326 08/15/20  0359   WBC 6.0 6.9 6.6   HGB 14.5 14.5 13.6   HCT 43.7 45.0 42.5   MCV 90.9 92.2 93.2    156 158     BMP:   Recent Labs     08/14/20  2326 08/15/20  0359 08/16/20  0354    143 144   K 4.3 4.1 4.2    108 106   CO2 25 27 30   BUN 22 24* 23   CREATININE 0.9 1.0 1.2*     PT/INR: No results for input(s): PROTIME, INR in the last 72 hours. BNP:  No results for input(s): PROBNP in the last 72 hours. TROPONIN:   Recent Labs     08/14/20  1444 08/14/20  2326 08/15/20  0359   TROPONINT <0.010 <0.010 <0.010        Impression:  Active Problems:    Chest pain  Resolved Problems:    * No resolved hospital problems. *       All labs, medications and tests reviewed by myself, continue all other medications of all above medical condition listed as is except for changes mentioned above. Thank you   Please call with questions. Electronically signed by CAROLINA Stein CNP on 8/17/2020 at 9:43 AM   CARDIOLOGY ATTENDING ADDENDUM    I have seen ,spoken to  and examined this patient personally, independently of the nurse practitioner. I have reviewed the hospital care given to date and reviewed all pertinent labs and imaging. The plan was developed mutually at the time of the visit with the patient,  NP   and myself. I have spoken with patient, nursing staff and provided written and verbal instructions . The above note has been reviewed and I agree with the assessment, diagnosis, and treatment plan with changes made by me as follows     HPI:  I have reviewed the above HPI  And agree with above   Mikayla Rodriguez is a 69 y. o.year old who and presents with had concerns including Chest Pain (for couple weeks ). Chief Complaint   Patient presents with    Chest Pain     for couple weeks      Please review addendum/changes made to note above   Interval history:  No chst pain     Physical Exam:  General:  Awake, alert, NAD  Head:normal  Eye:normal  Neck:  No JVD   Chest:  Clear to auscultation, respiration easy  Cardiovascular:  S1 and S2 audible, No added heart sounds, No signs of ankle edema, or volume overload, No evidence of JVD, No crackles  Abdomen:   nontender  Extremities:  No  edema  Pulses; palpable  Neuro: grossly normal      MEDICAL DECISION MAKING;    I agree with the above plan, which was planned by myself and discussed with NP.   Low risk stress test normal echo okay to discharge follow-up in office      Mi Rousseau MD [address] [facility] 08/17/20

## 2021-03-10 ENCOUNTER — HOSPITAL ENCOUNTER (OUTPATIENT)
Dept: MAMMOGRAPHY | Age: 71
Discharge: HOME | End: 2021-03-10
Payer: MEDICARE

## 2021-03-10 DIAGNOSIS — Z12.31 ENCOUNTER FOR SCREENING MAMMOGRAM FOR MALIGNANT NEOPLASM OF BREAST: ICD-10-CM

## 2021-03-10 PROCEDURE — 77063 BREAST TOMOSYNTHESIS BI: CPT

## 2022-01-06 ENCOUNTER — HOSPITAL ENCOUNTER (OUTPATIENT)
Age: 72
Setting detail: SPECIMEN
Discharge: HOME | End: 2022-01-06
Payer: MEDICARE

## 2022-01-06 ENCOUNTER — OFFICE VISIT (OUTPATIENT)
Dept: FAMILY MEDICINE CLINIC | Age: 72
End: 2022-01-06
Payer: MEDICARE

## 2022-01-06 VITALS — WEIGHT: 159.8 LBS | HEART RATE: 104 BPM | OXYGEN SATURATION: 95 % | BODY MASS INDEX: 26.59 KG/M2

## 2022-01-06 DIAGNOSIS — R05.9 COUGH: Primary | ICD-10-CM

## 2022-01-06 DIAGNOSIS — J01.90 ACUTE BACTERIAL SINUSITIS: ICD-10-CM

## 2022-01-06 DIAGNOSIS — B96.89 ACUTE BACTERIAL SINUSITIS: ICD-10-CM

## 2022-01-06 PROBLEM — G89.29 CHRONIC BILATERAL LOW BACK PAIN WITHOUT SCIATICA: Status: ACTIVE | Noted: 2018-12-01

## 2022-01-06 PROBLEM — M54.6 CHRONIC BILATERAL THORACIC BACK PAIN: Status: ACTIVE | Noted: 2018-12-01

## 2022-01-06 PROBLEM — H90.3 SENSORINEURAL HEARING LOSS, BILATERAL: Status: ACTIVE | Noted: 2022-01-06

## 2022-01-06 PROBLEM — M99.00 SOMATIC DYSFUNCTION OF HEAD REGION: Status: ACTIVE | Noted: 2018-12-01

## 2022-01-06 PROBLEM — G89.29 CHRONIC BILATERAL THORACIC BACK PAIN: Status: ACTIVE | Noted: 2018-12-01

## 2022-01-06 PROBLEM — M54.50 CHRONIC BILATERAL LOW BACK PAIN WITHOUT SCIATICA: Status: ACTIVE | Noted: 2018-12-01

## 2022-01-06 PROCEDURE — 3017F COLORECTAL CA SCREEN DOC REV: CPT | Performed by: NURSE PRACTITIONER

## 2022-01-06 PROCEDURE — 1123F ACP DISCUSS/DSCN MKR DOCD: CPT | Performed by: NURSE PRACTITIONER

## 2022-01-06 PROCEDURE — G8417 CALC BMI ABV UP PARAM F/U: HCPCS | Performed by: NURSE PRACTITIONER

## 2022-01-06 PROCEDURE — G8399 PT W/DXA RESULTS DOCUMENT: HCPCS | Performed by: NURSE PRACTITIONER

## 2022-01-06 PROCEDURE — G8484 FLU IMMUNIZE NO ADMIN: HCPCS | Performed by: NURSE PRACTITIONER

## 2022-01-06 PROCEDURE — U0003 INFECTIOUS AGENT DETECTION BY NUCLEIC ACID (DNA OR RNA); SEVERE ACUTE RESPIRATORY SYNDROME CORONAVIRUS 2 (SARS-COV-2) (CORONAVIRUS DISEASE [COVID-19]), AMPLIFIED PROBE TECHNIQUE, MAKING USE OF HIGH THROUGHPUT TECHNOLOGIES AS DESCRIBED BY CMS-2020-01-R: HCPCS

## 2022-01-06 PROCEDURE — 4040F PNEUMOC VAC/ADMIN/RCVD: CPT | Performed by: NURSE PRACTITIONER

## 2022-01-06 PROCEDURE — 1090F PRES/ABSN URINE INCON ASSESS: CPT | Performed by: NURSE PRACTITIONER

## 2022-01-06 PROCEDURE — G8427 DOCREV CUR MEDS BY ELIG CLIN: HCPCS | Performed by: NURSE PRACTITIONER

## 2022-01-06 PROCEDURE — U0005 INFEC AGEN DETEC AMPLI PROBE: HCPCS

## 2022-01-06 PROCEDURE — 99213 OFFICE O/P EST LOW 20 MIN: CPT | Performed by: NURSE PRACTITIONER

## 2022-01-06 PROCEDURE — 4004F PT TOBACCO SCREEN RCVD TLK: CPT | Performed by: NURSE PRACTITIONER

## 2022-01-06 RX ORDER — CEFDINIR 300 MG/1
300 CAPSULE ORAL 2 TIMES DAILY
Qty: 14 CAPSULE | Refills: 0 | Status: SHIPPED | OUTPATIENT
Start: 2022-01-06 | End: 2022-01-13

## 2022-01-06 RX ORDER — PROGESTERONE 100 MG/1
100 CAPSULE ORAL DAILY
COMMUNITY

## 2022-01-06 RX ORDER — MONTELUKAST SODIUM 10 MG/1
1 TABLET ORAL DAILY
COMMUNITY

## 2022-01-06 RX ORDER — HYOSCYAMINE SULFATE EXTENDED-RELEASE 0.38 MG/1
1 TABLET ORAL 2 TIMES DAILY
COMMUNITY

## 2022-01-06 RX ORDER — PREDNISONE 20 MG/1
10 TABLET ORAL 2 TIMES DAILY
Qty: 7 TABLET | Refills: 0 | Status: SHIPPED | OUTPATIENT
Start: 2022-01-06 | End: 2022-01-13

## 2022-01-06 RX ORDER — PHENAZOPYRIDINE HYDROCHLORIDE 200 MG/1
1 TABLET, FILM COATED ORAL 3 TIMES DAILY PRN
COMMUNITY
Start: 2021-11-11

## 2022-01-06 RX ORDER — BENZONATATE 100 MG/1
CAPSULE ORAL
Qty: 30 CAPSULE | Refills: 0 | Status: SHIPPED | OUTPATIENT
Start: 2022-01-06

## 2022-01-06 NOTE — PROGRESS NOTES
1/6/2022    HPI:  Chief complaint and history of present illness as per medical assistant/nurse documented today in the Flu/COVID-19 clinic. MEDICATIONS:  Prior to Visit Medications    Medication Sig Taking? Authorizing Provider   fluticasone-salmeterol (ADVAIR DISKUS) 250-50 MCG/DOSE AEPB Inhale 1 puff into the lungs 2 times daily Yes Historical Provider, MD   progesterone (PROMETRIUM) 100 MG CAPS capsule Take 100 mg by mouth daily Yes Historical Provider, MD   cefdinir (OMNICEF) 300 MG capsule Take 1 capsule by mouth 2 times daily for 7 days Yes CAROLINA Mistry CNP   predniSONE (DELTASONE) 20 MG tablet Take 0.5 tablets by mouth 2 times daily for 7 days WITH FOOD Yes CAROLINA Mistry CNP   benzonatate (TESSALON PERLES) 100 MG capsule 1-2 caps PO TID prn for cough Yes CAROLINA Mistry CNP   phenazopyridine (PYRIDIUM) 200 MG tablet Take 1 tablet by mouth 3 times daily as needed  Historical Provider, MD   hyoscyamine (LEVBID) 375 MCG extended release tablet Take 1 tablet by mouth 2 times daily  Historical Provider, MD   montelukast (SINGULAIR) 10 MG tablet Take 1 tablet by mouth daily  Historical Provider, MD   nitroGLYCERIN (NITROSTAT) 0.4 MG SL tablet up to max of 3 total doses. If no relief after 1 dose, call 911.   Odessa Pelaez MD   metoprolol tartrate (LOPRESSOR) 25 MG tablet Take 1 tablet by mouth 2 times daily  Odessa Pelaez MD   sucralfate (CARAFATE) 1 GM tablet Take 1 tablet by mouth 4 times daily  Odessa Pelaez MD   pantoprazole sodium (PROTONIX) 40 MG PACK packet Take 40 mg by mouth every morning (before breakfast)  Historical Provider, MD   ezetimibe (ZETIA) 10 MG tablet Take 10 mg by mouth daily  Historical Provider, MD   sertraline (ZOLOFT) 100 MG tablet Take 100 mg by mouth daily  Historical Provider, MD   estradiol (ESTRACE) 0.1 MG/GM vaginal cream Place 2 g vaginally daily  Historical Provider, MD   gabapentin (NEURONTIN) 100 MG capsule Take 100 mg by mouth 3 times daily.  Historical Provider, MD   azelastine (ASTELIN) 0.1 % nasal spray 1 spray by Nasal route 2 times daily Use in each nostril as directed  Historical Provider, MD   fluticasone (FLONASE) 50 MCG/ACT nasal spray 2 sprays by Each Nostril route daily  Historical Provider, MD   Calcium Carbonate-Vit D-Min (CALCIUM 1200 PO) Take 1,200 mg by mouth daily  Historical Provider, MD   Multiple Vitamins-Minerals (THERAPEUTIC MULTIVITAMIN-MINERALS) tablet Take 1 tablet by mouth daily  Historical Provider, MD   vitamin D3 (CHOLECALCIFEROL) 10 MCG (400 UNIT) TABS tablet Take 1,000 Units by mouth daily  Historical Provider, MD   docusate sodium (COLACE) 100 MG capsule Take 100 mg by mouth 2 times daily  Historical Provider, MD           PHYSICAL EXAM:  Physical Exam  Vitals reviewed. Constitutional:       General: She is not in acute distress. Appearance: Normal appearance. She is not ill-appearing or diaphoretic. HENT:      Head: Normocephalic. Right Ear: Ear canal and external ear normal. A middle ear effusion is present. Tympanic membrane is not erythematous. Left Ear: Ear canal and external ear normal. A middle ear effusion is present. There is impacted cerumen. Tympanic membrane is not erythematous. Nose: Congestion and rhinorrhea present. Rhinorrhea is clear. Right Sinus: Maxillary sinus tenderness and frontal sinus tenderness present. Left Sinus: Maxillary sinus tenderness present. Mouth/Throat:      Lips: Pink. Mouth: Mucous membranes are moist.      Pharynx: Uvula midline. Posterior oropharyngeal erythema present. No pharyngeal swelling, oropharyngeal exudate or uvula swelling. Tonsils: 0 on the right. Cardiovascular:      Rate and Rhythm: Normal rate and regular rhythm. Heart sounds: Normal heart sounds. No murmur heard. No gallop. Pulmonary:      Effort: Pulmonary effort is normal. Prolonged expiration present. No respiratory distress. Breath sounds:  No symptoms worsen or fail to improve.     In addition to other information, the printed after visit summary provided to the patient includes:  [x] COVID-19 Self care instructions  [x] COVID-19 General patient information

## 2022-01-06 NOTE — PATIENT INSTRUCTIONS
Your COVID 19 test can take 1-3 days for the results to come back. We ask that you make a Mychart page and view your test results this way. You will need to Self quarantine until you know your results. Increase fluids and rest  Saline nasal spray as needed for nasal congestion  Warm salt gargles as needed for throat discomfort  Monitor temperature twice a day  Tylenol as needed for fevers and/or discomfort. Big deep breaths periodically throughout the day  Regular Mucinex over the counter as needed for chest congestion  If symptoms worsen -Go to the ER. Follow up with your primary care provider      To Whom it May Concern:    Micah Aaron was tested for COVID-19 1/6/2022. He/she must stay home until test results are back. If test is positive, isolate for a total of 5 days, starting from day 1 of symptom onset. After 5 days, if fever-free for 24 hours and there has been a gradual improvement in symptoms, may come out of isolation, but must consistently wear a mask when around other people for 5 additional days. (5 days isolation, 5 days mask-wearing). We do not recommend retesting as patients may continue to test positive for months even though no longer contagious. It is suggested you call [address] [place] [address] or [address] [place] [address] with any questions regarding isolation timeframe/return to work/school details. Patient Education        Sinusitis: Care Instructions  Your Care Instructions     Sinusitis is an infection of the lining of the sinus cavities in your head. Sinusitis often follows a cold. It causes pain and pressure in your head and face. In most cases, sinusitis gets better on its own in 1 to 2 weeks. But some mild symptoms may last for several weeks. Sometimes antibiotics are needed. Follow-up care is a key part of your treatment and safety. Be sure to make and go to all appointments, and call your doctor if you are having problems.  It's also a good idea to know your test results and keep a list of the medicines you take. How can you care for yourself at home? · Take an over-the-counter pain medicine, such as acetaminophen (Tylenol), ibuprofen (Advil, Motrin), or naproxen (Aleve). Read and follow all instructions on the label. · If the doctor prescribed antibiotics, take them as directed. Do not stop taking them just because you feel better. You need to take the full course of antibiotics. · Be careful when taking over-the-counter cold or flu medicines and Tylenol at the same time. Many of these medicines have acetaminophen, which is Tylenol. Read the labels to make sure that you are not taking more than the recommended dose. Too much acetaminophen (Tylenol) can be harmful. · Breathe warm, moist air from a steamy shower, a hot bath, or a sink filled with hot water. Avoid cold, dry air. Using a humidifier in your home may help. Follow the directions for cleaning the machine. · Use saline (saltwater) nasal washes. This can help keep your nasal passages open and wash out mucus and bacteria. You can buy saline nose drops at a grocery store or drugstore. Or you can make your own at home by adding 1 teaspoon of salt and 1 teaspoon of baking soda to 2 cups of distilled water. If you make your own, fill a bulb syringe with the solution, insert the tip into your nostril, and squeeze gently. Yasmani Patter your nose. · Put a hot, wet towel or a warm gel pack on your face 3 or 4 times a day for 5 to 10 minutes each time. · Try a decongestant nasal spray like oxymetazoline (Afrin). Do not use it for more than 3 days in a row. Using it for more than 3 days can make your congestion worse. When should you call for help? Call your doctor now or seek immediate medical care if:    · You have new or worse swelling or redness in your face or around your eyes.     · You have a new or higher fever.    Watch closely for changes in your health, and be sure to contact your doctor if:    · You have new or worse facial pain.     · The mucus from your nose becomes thicker (like pus) or has new blood in it.     · You are not getting better as expected. Where can you learn more? Go to https://[employer]. org and sign in to your [employer] account. Enter R583 in the [facility] box to learn more about \"Sinusitis: Care Instructions. \"     If you do not have an account, please click on the \"Sign Up Now\" link. Current as of: September 8, 2021               Content Version: 13.1  © 0971-1173 Healthwise, Inc[employer]. Care instructions adapted under license by [facility] ([facility]). If you have questions about a medical condition or this instruction, always ask your healthcare professional. [address] any warranty or liability for your use of this information.

## 2022-01-06 NOTE — PROGRESS NOTES
1/6/22  Mariposa Matson  1950    FLU/COVID-19 CLINIC EVALUATION    HPI SYMPTOMS:    Employer:    [] Fevers  [] Chills  [x] Cough  [] Coughing up blood  [x] Chest Congestion  [x] Nasal Congestion  [] Feeling short of breath  [] Sometimes  [] Frequently  [] All the time  [] Chest pain  [x] Headaches  []Tolerable  [x] Severe  [] Sore throat  [] Muscle aches  [] Nausea  [] Vomiting  []Unable to keep fluids down  [] Diarrhea  []Severe    [x] OTHER SYMPTOMS:  Loss of taste and smell, fatigue    Symptom Duration:   [] 1  [] 2   [] 3   [] 4    [] 5   [] 6   [] 7   [] 8   [x] 9   [] 10   [] 11   [] 12   [] 13   [] 14   [] Longer than 14 days    Symptom course:   [] Worsening     [] Stable     [x] Improving    RISK FACTORS:    [] Pregnant or possibly pregnant  [x] Age over 61  [] Diabetes  [] Heart disease  [x] Asthma  [] COPD/Other chronic lung diseases  [] Active Cancer  [] On Chemotherapy  [] Taking oral steroids  [] History Lymphoma/Leukemia  [] Close contact with a lab confirmed COVID-19 patient within 14 days of symptom onset  [] History of travel from affected geographical areas within 14 days of symptom onset       VITALS:  Vitals:    01/06/22 0956   Pulse: 104   SpO2: 95%   Weight: 159 lb 12.8 oz (72.5 kg)        TESTS:    POCT FLU:  [] Positive     []Negative    ASSESSMENT:    [] Flu  [x] Possible COVID-19  [] Strep    PLAN:    [] Discharge home with written instructions for:  [] Flu management  [] Possible COVID-19 infection with self-quarantine and management of symptoms  [] Follow-up with primary care physician or emergency department if worsens  [] Evaluation per physician/NP/PA in clinic  [] Sent to ER       An  electronic signature was used to authenticate this note.      --Gala Cope MA on 1/6/2022 at 10:02 AM

## 2022-01-07 LAB
SARS-COV-2: DETECTED
SOURCE: ABNORMAL

## 2022-03-16 ENCOUNTER — HOSPITAL ENCOUNTER (OUTPATIENT)
Dept: MAMMOGRAPHY | Age: 72
Discharge: HOME | End: 2022-03-16
Payer: MEDICARE

## 2022-03-16 DIAGNOSIS — Z12.31 BREAST CANCER SCREENING BY MAMMOGRAM: ICD-10-CM

## 2022-03-16 PROCEDURE — 77063 BREAST TOMOSYNTHESIS BI: CPT

## 2022-07-29 ENCOUNTER — HOSPITAL ENCOUNTER (OUTPATIENT)
Dept: CT IMAGING | Age: 72
Discharge: HOME | End: 2022-07-29
Payer: MEDICARE

## 2022-07-29 DIAGNOSIS — R10.9 RIGHT FLANK PAIN: ICD-10-CM

## 2022-07-29 DIAGNOSIS — R31.9 HEMATURIA, UNSPECIFIED TYPE: ICD-10-CM

## 2022-07-29 PROCEDURE — 74176 CT ABD & PELVIS W/O CONTRAST: CPT

## 2023-02-28 ENCOUNTER — HOSPITAL ENCOUNTER (OUTPATIENT)
Age: 73
Discharge: HOME | End: 2023-02-28
Payer: MEDICARE

## 2023-02-28 ENCOUNTER — HOSPITAL ENCOUNTER (OUTPATIENT)
Dept: GENERAL RADIOLOGY | Age: 73
Discharge: HOME | End: 2023-02-28
Payer: MEDICARE

## 2023-02-28 DIAGNOSIS — J40 BRONCHITIS, NOT SPECIFIED AS ACUTE OR CHRONIC: ICD-10-CM

## 2023-02-28 PROCEDURE — 71046 X-RAY EXAM CHEST 2 VIEWS: CPT

## 2023-03-20 ENCOUNTER — HOSPITAL ENCOUNTER (OUTPATIENT)
Dept: WOMENS IMAGING | Age: 73
Discharge: HOME | End: 2023-03-20
Payer: MEDICARE

## 2023-03-20 DIAGNOSIS — Z12.31 ENCOUNTER FOR SCREENING MAMMOGRAM FOR MALIGNANT NEOPLASM OF BREAST: ICD-10-CM

## 2023-03-20 PROCEDURE — 77063 BREAST TOMOSYNTHESIS BI: CPT

## 2023-07-13 ENCOUNTER — HOSPITAL ENCOUNTER (OUTPATIENT)
Dept: MRI IMAGING | Age: 73
Discharge: HOME | End: 2023-07-13
Payer: MEDICARE

## 2023-07-13 DIAGNOSIS — M25.561 RIGHT KNEE PAIN, UNSPECIFIED CHRONICITY: ICD-10-CM

## 2023-07-13 PROCEDURE — 73721 MRI JNT OF LWR EXTRE W/O DYE: CPT

## 2023-09-12 ENCOUNTER — HOSPITAL ENCOUNTER (OUTPATIENT)
Dept: GENERAL RADIOLOGY | Age: 73
Discharge: HOME | End: 2023-09-12
Payer: MEDICARE

## 2023-09-12 ENCOUNTER — HOSPITAL ENCOUNTER (OUTPATIENT)
Age: 73
Discharge: HOME | End: 2023-09-12
Payer: MEDICARE

## 2023-09-12 DIAGNOSIS — Z79.83 ENCOUNTER FOR LONG-TERM USE OF BIOPHOSPHONATES: ICD-10-CM

## 2023-09-12 DIAGNOSIS — M65.4 DE QUERVAIN'S DISEASE (RADIAL STYLOID TENOSYNOVITIS): ICD-10-CM

## 2023-09-12 PROCEDURE — 73090 X-RAY EXAM OF FOREARM: CPT

## 2023-09-21 ENCOUNTER — HOSPITAL ENCOUNTER (OUTPATIENT)
Dept: WOMENS IMAGING | Age: 73
Discharge: HOME | End: 2023-09-21
Payer: MEDICARE

## 2023-09-21 DIAGNOSIS — Z79.83 LONG TERM (CURRENT) USE OF BISPHOSPHONATES: ICD-10-CM

## 2023-09-21 DIAGNOSIS — M65.4 RADIAL STYLOID TENOSYNOVITIS: ICD-10-CM

## 2023-09-21 PROCEDURE — 77080 DXA BONE DENSITY AXIAL: CPT

## 2023-10-17 ENCOUNTER — OFFICE VISIT (OUTPATIENT)
Dept: ORTHOPEDIC SURGERY | Age: 73
End: 2023-10-17

## 2023-10-17 VITALS — TEMPERATURE: 98.1 F | OXYGEN SATURATION: 99 % | HEART RATE: 63 BPM | RESPIRATION RATE: 16 BRPM

## 2023-10-17 DIAGNOSIS — M79.641 RIGHT HAND PAIN: Primary | ICD-10-CM

## 2023-10-17 DIAGNOSIS — M65.341 TRIGGER FINGER, RIGHT RING FINGER: ICD-10-CM

## 2023-10-17 NOTE — PROGRESS NOTES
10/17/2023   Chief Complaint   Patient presents with    Consultation     Right wrist pain        History of Present Illness:                             Jorge A Valverde is a 75 y.o. female who presents today for evaluation of her right hand and wrist pain. Symptoms are severe and affecting the entire upper extremity from the shoulder to the hand but most significant along the hand and wrist area especially along the ring finger. She has complaints of occasional numbness, burning, shooting pain, and stiffness. She has occasional catching and clicking sensations at the ring finger when trying to do active range of motion activities. She denies any falls or injuries. She has been trying rest and bracing over the last month with no significant relief of her symptoms. Pain is limiting her on a daily basis and she has trouble using her hand for even light activities at this stage. She has been seen by her primary care physician and has been undergoing manipulation maneuvers to address multi areas of musculoskeletal pain. Medical History  Patient's medications, allergies, past medical, surgical, social and family histories were reviewed and updated as appropriate.     Past Medical History:   Diagnosis Date    Asthma     Hyperlipidemia      Past Surgical History:   Procedure Laterality Date    APPENDECTOMY      BREAST SURGERY Right     x2 excisional bx    CHOLECYSTECTOMY      HAND SURGERY      TONSILLECTOMY      TUBAL LIGATION       Family History   Problem Relation Age of Onset    Ovarian Cancer Maternal Grandmother     Breast Cancer Neg Hx      Social History     Socioeconomic History    Marital status:    Tobacco Use    Smoking status: Never   Substance and Sexual Activity    Alcohol use: Never    Drug use: Never    Sexual activity: Never     Current Outpatient Medications   Medication Sig Dispense Refill    fluticasone-salmeterol (ADVAIR DISKUS) 250-50 MCG/DOSE AEPB Inhale 1 puff into

## 2023-10-17 NOTE — PATIENT INSTRUCTIONS
Continue to weight bear as tolerated  Continue range of motion  Ice and elevate as needed  Tylenol or Motrin for pain  You have been referred for an EMG with Dr. Bandar Olmstead. Once EMG is scheduled, please call our office back to schedule a follow up appointment. Our phone number is [phone]. If you have not heard from Dr. Sintia Strickland within 2-3 business days from today's appointment please call them at [phone]. We are committed to providing you the best care possible. If you receive a survey after visiting one of our offices, please take time to share your experience concerning your physician office visit. These surveys are confidential and no health information about you is shared. We are eager to improve for you and we are counting on your feedback to help make that happen.

## 2023-10-19 DIAGNOSIS — M79.641 RIGHT HAND PAIN: Primary | ICD-10-CM

## 2023-10-22 PROBLEM — M79.641 RIGHT HAND PAIN: Status: ACTIVE | Noted: 2023-10-22

## 2023-10-22 PROBLEM — M65.341 TRIGGER FINGER, RIGHT RING FINGER: Status: ACTIVE | Noted: 2023-10-22

## 2023-10-22 ASSESSMENT — ENCOUNTER SYMPTOMS
EYE REDNESS: 0
VOMITING: 0
COLOR CHANGE: 0
EYE PAIN: 0
CHEST TIGHTNESS: 0
WHEEZING: 0
SHORTNESS OF BREATH: 0

## 2023-11-14 ENCOUNTER — PROCEDURE VISIT (OUTPATIENT)
Dept: PHYSICAL MEDICINE AND REHAB | Age: 73
End: 2023-11-14
Payer: MEDICARE

## 2023-11-14 DIAGNOSIS — G56.03 BILATERAL CARPAL TUNNEL SYNDROME: Primary | ICD-10-CM

## 2023-11-14 DIAGNOSIS — M79.601 PARESTHESIA AND PAIN OF BOTH UPPER EXTREMITIES: ICD-10-CM

## 2023-11-14 DIAGNOSIS — G56.21 ULNAR NEUROPATHY AT ELBOW, RIGHT: ICD-10-CM

## 2023-11-14 DIAGNOSIS — M79.602 PARESTHESIA AND PAIN OF BOTH UPPER EXTREMITIES: ICD-10-CM

## 2023-11-14 DIAGNOSIS — M65.4 DE QUERVAIN'S TENOSYNOVITIS: ICD-10-CM

## 2023-11-14 DIAGNOSIS — R20.2 PARESTHESIA AND PAIN OF BOTH UPPER EXTREMITIES: ICD-10-CM

## 2023-11-14 PROCEDURE — 95886 MUSC TEST DONE W/N TEST COMP: CPT | Performed by: PHYSICAL MEDICINE & REHABILITATION

## 2023-11-14 PROCEDURE — 95911 NRV CNDJ TEST 9-10 STUDIES: CPT | Performed by: PHYSICAL MEDICINE & REHABILITATION

## 2023-11-14 NOTE — PROGRESS NOTES
EMG REPORT     CHIEF COMPLAINT: Exquisite pain about her right wrist.    HISTORY OF PRESENT ILLNESS: 75 y.o. right hand dominant female with abrupt onset of right radial wrist pain at the end of July/beginning of August of this year. The pain hurts when she rotates her forearm,  things in her hand or touches the base of her right thumb. She rarely has numbness and tingling in her fingers of each hand. She has been getting some locking of her ring finger into flexion on the right. She has a history of trigger finger release on the left hand. She rated her pain severity as 10/10. She has not had any neck pain radiating into her upper limbs. Her symptoms do not wake her from sleep nor have they caused her to drop anything. She has no history of diabetes or any thyroid disorder. PHYSICAL EXAMINATION: Alert. About 80 degrees of active cervical spine rotation bilaterally. Spurling's maneuver was negative. Upper limb reflexes were trace only. Tinel sign was negative. She is tender to palpation along the radial border of the distal right forearm and at the base of the right thumb. There is minimal crepitus with passive range of motion of her first MCP joint. Finkelstein's maneuver was positive. There was giveaway with strength testing of power , thumb opposition and wrist extension with pain. No proximal weakness. There was no atrophy, tremor or clonus. Perception of touch was maintained in all fingers. NERVE CONDUCTION STUDIES:     MOTOR         LATENCY NORMAL AMPLITUDE DISTANCE COND. ENDY.    RIGHT  MEDIAN 3.7 < 4.2 msec 5.8 8 cm 63   LEFT  MEDIAN 3.9 < 4.2 msec 2.6 8 cm >50   RIGHT  ULNAR 2.9 < 4.2 msec 6.3/5.8 8 cm 69/43   LEFT  ULNAR 2.9 < 4.2 msec 6.2 8 cm >50      SENSORY  ORTHODROMIC        LATENCY NORMAL AMPLITUDE DISTANCE   RIGHT MEDIAN 2.8 <2.3 msec 49 10 cm   LEFT  MEDIAN 2.8 < 2.3 msec 27 10 cm   RIGHT  ULNAR 2.4 < 2.3 msec 18 10 cm   LEFT  ULNAR 2.1 < 2.3 msec 19 10 cm

## 2023-11-16 ENCOUNTER — OFFICE VISIT (OUTPATIENT)
Dept: ORTHOPEDIC SURGERY | Age: 73
End: 2023-11-16

## 2023-11-16 VITALS — TEMPERATURE: 97.2 F | RESPIRATION RATE: 18 BRPM | HEART RATE: 82 BPM | OXYGEN SATURATION: 97 %

## 2023-11-16 DIAGNOSIS — M25.531 RIGHT WRIST PAIN: ICD-10-CM

## 2023-11-16 DIAGNOSIS — M25.521 RIGHT ELBOW PAIN: Primary | ICD-10-CM

## 2023-11-16 DIAGNOSIS — G56.01 RIGHT CARPAL TUNNEL SYNDROME: ICD-10-CM

## 2023-11-16 RX ORDER — TRIAMCINOLONE ACETONIDE 40 MG/ML
30 INJECTION, SUSPENSION INTRA-ARTICULAR; INTRAMUSCULAR ONCE
Status: SHIPPED | OUTPATIENT
Start: 2023-11-16

## 2023-11-16 ASSESSMENT — ENCOUNTER SYMPTOMS
SHORTNESS OF BREATH: 0
CHEST TIGHTNESS: 0
COLOR CHANGE: 0

## 2023-11-16 NOTE — PATIENT INSTRUCTIONS
Continue weight-bearing as tolerated. Continue range of motion exercises as instructed. Ice and elevate as needed. Tylenol or Motrin for pain.   Injection given in right wrist   Referred to occupational hand therapy at [facility]   Follow up in 6 weeks

## 2023-11-16 NOTE — PROGRESS NOTES
Patient seen in office today for:  IKER CTS, R>L    Patient interested in speaking to provider about surgical option. EMG performed 11/14/2023, impression below. IMPRESSION:                  1.  Abnormal EMG. There are electrically minimal median neuropathies at each wrist (electrically minimal and chronic bilateral CTS). Additionally, she has a minor ulnar motor neuropathy at the right elbow. I do not believe that either of these are responsible for her presenting symptoms, however. 2.  No evidence of a concurrent cervical spinal nerve root lesion (radiculopathy), plexopathy, generalized neuropathy or primary muscle disease.
Eval and Treat     Referral Reason:   Specialty Services Required     Requested Specialty:   Occupational Therapy     Number of Visits Requested:   1    20600 - DC DRAIN/INJECT SMALL JOINT/BURSA       Assessment and Plan  1. Right wrist pain    2. Right elbow pain    3. Possible fibromyalgia    I reviewed the results of the EMG nerve conduction velocity test with the patient and explained that there is no evidence of significant nerve compression present. Her current exam is consistent with joint pain at the wrist.    There is no evidence of significant degenerative joint disease. I recommended that we proceed with a steroid injection to see if that helps with pain relief and inflammation. Procedure note, right wrist injection:  Risks and benefits were discussed as well as expected outcomes. She would like to proceed with injection. The dorsal radial aspect of the wrist was prepped with alcohol. The wrist joint was injected using 25-gauge needle and 30 mg of Kenalog and 1 mL of 1% plain lidocaine was injected. A sterile bandage was applied. She tolerated it well. I have sent an order for hand therapy to address rehabilitation and strengthening and range of motion of the wrist.    Follow-up in 4 to 6 weeks for check of her response to the injection and therapy. If she continues to have ongoing symptoms she may benefit from referral to rheumatology.             Electronically signed by Gino Hurtado MD on 11/16/2023 at 11:22 AM

## 2023-12-28 ENCOUNTER — OFFICE VISIT (OUTPATIENT)
Dept: ORTHOPEDIC SURGERY | Age: 73
End: 2023-12-28
Payer: COMMERCIAL

## 2023-12-28 VITALS
HEART RATE: 73 BPM | BODY MASS INDEX: 25.66 KG/M2 | OXYGEN SATURATION: 95 % | WEIGHT: 154 LBS | RESPIRATION RATE: 12 BRPM | HEIGHT: 65 IN

## 2023-12-28 DIAGNOSIS — M25.531 RIGHT WRIST PAIN: ICD-10-CM

## 2023-12-28 DIAGNOSIS — M25.521 RIGHT ELBOW PAIN: Primary | ICD-10-CM

## 2023-12-28 PROCEDURE — 1123F ACP DISCUSS/DSCN MKR DOCD: CPT | Performed by: ORTHOPAEDIC SURGERY

## 2023-12-28 PROCEDURE — 99212 OFFICE O/P EST SF 10 MIN: CPT | Performed by: ORTHOPAEDIC SURGERY

## 2023-12-28 RX ORDER — CEFDINIR 300 MG/1
CAPSULE ORAL
COMMUNITY
Start: 2023-12-06

## 2023-12-28 RX ORDER — LATANOPROST 50 UG/ML
SOLUTION/ DROPS OPHTHALMIC
COMMUNITY
Start: 2023-12-23

## 2023-12-28 NOTE — PATIENT INSTRUCTIONS
Continue weight-bearing as tolerated.  Continue range of motion exercises as instructed.  Ice and elevate as needed.  Tylenol or Motrin for pain.  Follow up as needed.

## 2023-12-28 NOTE — PROGRESS NOTES
12/28/2023   Chief Complaint   Patient presents with    Elbow Pain     Right    Hand Pain     Right wrist        History of Present Illness:                             Mariposa Matson is a 73 y.o. female who returns today for follow-up of her right upper extremity pain.  She is pleased with her response to hand therapy.  She states the injection may have helped a little but mostly she is improved with the stretching and exercises performed through occupational therapy.    Patient returns to the office following a steroid injection and OT for the right wrist and elbow. Pt stated that she rosenberg not know if the relief came from therapy or the injection but she has seen significant progress since the last visit. Pt stated that she is able to move her hand much better than before the last visit and has been using a brace. Pt will notice clicking but nothing causing increased pain.        Medical History  Patient's medications, allergies, past medical, surgical, social and family histories were reviewed and updated as appropriate.      Review of Systems   Constitutional:  Negative for activity change and fever.   Respiratory:  Negative for chest tightness and shortness of breath.    Cardiovascular:  Negative for chest pain.   Musculoskeletal:  Positive for arthralgias.   Skin:  Negative for color change.   Neurological:  Negative for dizziness and weakness.   Psychiatric/Behavioral:  The patient is not nervous/anxious.                                                Examination:  General Exam:  Vitals: Pulse 73   Resp 12   Ht 1.651 m (5' 5\")   Wt 69.9 kg (154 lb)   SpO2 95%   BMI 25.63 kg/m²    Physical Exam     Right upper extremity:  No tenderness to palpation at the wrist, forearm or elbow.  Painless active range of motion present in the wrist with no significant limitations from the wrist flexion, extension, supination pronation, and radial and ulnar deviation.    Sensation and motor functions intact

## 2023-12-28 NOTE — PROGRESS NOTES
Patient returns to the office following a steroid injection and OT for the right wrist and elbow. Pt stated that she rosenberg not know if the relief came from therapy or the injection but she has seen significant progress since the last visit. Pt stated that she is able to move her hand much better than before the last visit and has been using a brace. Pt will notice clicking but nothing causing increased pain.

## 2024-01-01 ASSESSMENT — ENCOUNTER SYMPTOMS
SHORTNESS OF BREATH: 0
COLOR CHANGE: 0
CHEST TIGHTNESS: 0

## 2024-05-01 ENCOUNTER — HOSPITAL ENCOUNTER (OUTPATIENT)
Dept: MAMMOGRAPHY | Age: 74
Discharge: HOME | End: 2024-05-01
Payer: MEDICARE

## 2024-05-01 DIAGNOSIS — Z12.31 SCREENING MAMMOGRAM FOR BREAST CANCER: ICD-10-CM

## 2024-05-01 PROCEDURE — 77063 BREAST TOMOSYNTHESIS BI: CPT

## 2024-06-21 ENCOUNTER — HOSPITAL ENCOUNTER (OUTPATIENT)
Dept: CT IMAGING | Age: 74
Discharge: HOME | End: 2024-06-21
Payer: MEDICARE

## 2024-06-21 DIAGNOSIS — R63.4 WEIGHT LOSS: ICD-10-CM

## 2024-06-21 DIAGNOSIS — R74.8 ABNORMAL LEVELS OF OTHER SERUM ENZYMES: ICD-10-CM

## 2024-06-21 PROCEDURE — 74177 CT ABD & PELVIS W/CONTRAST: CPT

## 2024-06-21 PROCEDURE — 6360000004 HC RX CONTRAST MEDICATION: Performed by: NURSE PRACTITIONER

## 2024-06-21 RX ADMIN — IOPAMIDOL 75 ML: 755 INJECTION, SOLUTION INTRAVENOUS at 09:57

## 2024-07-22 ENCOUNTER — HOSPITAL ENCOUNTER (EMERGENCY)
Age: 74
Discharge: HOME | End: 2024-07-22
Attending: EMERGENCY MEDICINE
Payer: MEDICARE

## 2024-07-22 VITALS
SYSTOLIC BLOOD PRESSURE: 133 MMHG | OXYGEN SATURATION: 96 % | TEMPERATURE: 97.7 F | BODY MASS INDEX: 25.66 KG/M2 | RESPIRATION RATE: 16 BRPM | WEIGHT: 154 LBS | DIASTOLIC BLOOD PRESSURE: 81 MMHG | HEART RATE: 78 BPM | HEIGHT: 65 IN

## 2024-07-22 DIAGNOSIS — H92.01 RIGHT EAR PAIN: ICD-10-CM

## 2024-07-22 DIAGNOSIS — H66.90 ACUTE OTITIS MEDIA, UNSPECIFIED OTITIS MEDIA TYPE: Primary | ICD-10-CM

## 2024-07-22 PROCEDURE — 99283 EMERGENCY DEPT VISIT LOW MDM: CPT

## 2024-07-22 PROCEDURE — 6370000000 HC RX 637 (ALT 250 FOR IP): Performed by: EMERGENCY MEDICINE

## 2024-07-22 RX ORDER — CLINDAMYCIN HYDROCHLORIDE 300 MG/1
300 CAPSULE ORAL 4 TIMES DAILY
Qty: 40 CAPSULE | Refills: 0 | Status: SHIPPED | OUTPATIENT
Start: 2024-07-22 | End: 2024-08-01

## 2024-07-22 RX ORDER — AMOXICILLIN AND CLAVULANATE POTASSIUM 875; 125 MG/1; MG/1
1 TABLET, FILM COATED ORAL 2 TIMES DAILY
Qty: 20 TABLET | Refills: 0 | Status: SHIPPED | OUTPATIENT
Start: 2024-07-22 | End: 2024-07-22 | Stop reason: ALTCHOICE

## 2024-07-22 RX ORDER — HYDROCODONE BITARTRATE AND ACETAMINOPHEN 5; 325 MG/1; MG/1
1 TABLET ORAL ONCE
Status: COMPLETED | OUTPATIENT
Start: 2024-07-22 | End: 2024-07-22

## 2024-07-22 RX ORDER — AMOXICILLIN AND CLAVULANATE POTASSIUM 875; 125 MG/1; MG/1
1 TABLET, FILM COATED ORAL ONCE
Status: COMPLETED | OUTPATIENT
Start: 2024-07-22 | End: 2024-07-22

## 2024-07-22 RX ADMIN — AMOXICILLIN AND CLAVULANATE POTASSIUM 1 TABLET: 875; 125 TABLET, FILM COATED ORAL at 05:40

## 2024-07-22 RX ADMIN — HYDROCODONE BITARTRATE AND ACETAMINOPHEN 1 TABLET: 5; 325 TABLET ORAL at 05:40

## 2024-07-22 NOTE — ED PROVIDER NOTES
use: Never    Sexual activity: Never   Other Topics Concern    Not on file   Social History Narrative    Not on file     Social Determinants of Health     Financial Resource Strain: Not on file   Food Insecurity: Not on file   Transportation Needs: Not on file   Physical Activity: Not on file   Stress: Not on file   Social Connections: Not on file   Intimate Partner Violence: Not on file   Housing Stability: Not on file     Current Facility-Administered Medications   Medication Dose Route Frequency Provider Last Rate Last Admin    HYDROcodone-acetaminophen (NORCO) 5-325 MG per tablet 1 tablet  1 tablet Oral Once Efe Mascorro MD        amoxicillin-clavulanate (AUGMENTIN) 875-125 MG per tablet 1 tablet  1 tablet Oral Once Efe Mascorro MD        triamcinolone acetonide (KENALOG-40) injection 30 mg  30 mg Intra-artICUlar Once Gus Quintanilla MD         Current Outpatient Medications   Medication Sig Dispense Refill    amoxicillin-clavulanate (AUGMENTIN) 875-125 MG per tablet Take 1 tablet by mouth 2 times daily for 10 days 20 tablet 0    latanoprost (XALATAN) 0.005 % ophthalmic solution INSTILL 1 DROP IN BOTH EYES AT BEDTIME      cefdinir (OMNICEF) 300 MG capsule TAKE 1 CAPSULE BY MOUTH EVERY 12 HOURS FOR 10 DAYS      fluticasone-salmeterol (ADVAIR DISKUS) 250-50 MCG/DOSE AEPB Inhale 1 puff into the lungs 2 times daily      progesterone (PROMETRIUM) 100 MG CAPS capsule Take 1 capsule by mouth daily      phenazopyridine (PYRIDIUM) 200 MG tablet Take 1 tablet by mouth 3 times daily as needed      hyoscyamine (LEVBID) 375 MCG extended release tablet Take 1 tablet by mouth 2 times daily      montelukast (SINGULAIR) 10 MG tablet Take 1 tablet by mouth daily      benzonatate (TESSALON PERLES) 100 MG capsule 1-2 caps PO TID prn for cough 30 capsule 0    metoprolol tartrate (LOPRESSOR) 25 MG tablet Take 1 tablet by mouth 2 times daily 60 tablet 3    sucralfate (CARAFATE) 1 GM tablet Take 1 tablet by mouth 4 times daily 120

## 2024-07-22 NOTE — DISCHARGE INSTRUCTIONS
Take Augmentin as instructed until gone  Take ibuprofen 800 mg 3 times a day as needed for pain  In addition you may take Tylenol 1000 mg 3-4 times a day as needed for pain

## 2024-09-18 ENCOUNTER — HOSPITAL ENCOUNTER (OUTPATIENT)
Dept: GENERAL RADIOLOGY | Age: 74
Discharge: HOME | End: 2024-09-18
Payer: MEDICARE

## 2024-09-18 ENCOUNTER — HOSPITAL ENCOUNTER (OUTPATIENT)
Age: 74
Discharge: HOME | End: 2024-09-18
Payer: MEDICARE

## 2024-09-18 DIAGNOSIS — M54.50 LUMBAR PAIN: ICD-10-CM

## 2024-09-18 DIAGNOSIS — M65.4 DE QUERVAIN'S TENOSYNOVITIS: ICD-10-CM

## 2024-09-18 PROCEDURE — 72100 X-RAY EXAM L-S SPINE 2/3 VWS: CPT

## 2024-09-18 PROCEDURE — 73502 X-RAY EXAM HIP UNI 2-3 VIEWS: CPT

## 2025-01-28 DIAGNOSIS — M85.80 OSTEOPENIA OF THE ELDERLY: Primary | ICD-10-CM

## 2025-01-28 RX ORDER — ONDANSETRON 2 MG/ML
8 INJECTION INTRAMUSCULAR; INTRAVENOUS
OUTPATIENT
Start: 2025-02-04

## 2025-01-28 RX ORDER — FAMOTIDINE 10 MG/ML
20 INJECTION, SOLUTION INTRAVENOUS
OUTPATIENT
Start: 2025-02-04

## 2025-01-28 RX ORDER — ALBUTEROL SULFATE 90 UG/1
4 INHALANT RESPIRATORY (INHALATION) PRN
OUTPATIENT
Start: 2025-02-04

## 2025-01-28 RX ORDER — ZOLEDRONIC ACID 0.05 MG/ML
5 INJECTION, SOLUTION INTRAVENOUS ONCE
OUTPATIENT
Start: 2025-02-04 | End: 2025-02-04

## 2025-01-28 RX ORDER — EPINEPHRINE 1 MG/ML
0.3 INJECTION, SOLUTION INTRAMUSCULAR; SUBCUTANEOUS PRN
OUTPATIENT
Start: 2025-02-04

## 2025-01-28 RX ORDER — HYDROCORTISONE SODIUM SUCCINATE 100 MG/2ML
100 INJECTION INTRAMUSCULAR; INTRAVENOUS
OUTPATIENT
Start: 2025-02-04

## 2025-01-28 RX ORDER — ACETAMINOPHEN 325 MG/1
650 TABLET ORAL
OUTPATIENT
Start: 2025-02-04

## 2025-01-28 RX ORDER — SODIUM CHLORIDE 9 MG/ML
INJECTION, SOLUTION INTRAVENOUS CONTINUOUS
OUTPATIENT
Start: 2025-02-04

## 2025-01-28 RX ORDER — SODIUM CHLORIDE 0.9 % (FLUSH) 0.9 %
5-40 SYRINGE (ML) INJECTION PRN
OUTPATIENT
Start: 2025-02-04

## 2025-01-28 RX ORDER — DIPHENHYDRAMINE HYDROCHLORIDE 50 MG/ML
50 INJECTION INTRAMUSCULAR; INTRAVENOUS
OUTPATIENT
Start: 2025-02-04

## 2025-02-05 ENCOUNTER — TELEPHONE (OUTPATIENT)
Dept: INFUSION THERAPY | Age: 75
End: 2025-02-05

## 2025-07-02 ENCOUNTER — HOSPITAL ENCOUNTER (OUTPATIENT)
Dept: MAMMOGRAPHY | Age: 75
Discharge: HOME | End: 2025-07-02
Payer: MEDICARE

## 2025-07-02 VITALS — WEIGHT: 145 LBS | HEIGHT: 65 IN | BODY MASS INDEX: 24.16 KG/M2

## 2025-07-02 DIAGNOSIS — Z12.31 ENCOUNTER FOR SCREENING MAMMOGRAM FOR BREAST CANCER: ICD-10-CM

## 2025-07-02 PROCEDURE — 77063 BREAST TOMOSYNTHESIS BI: CPT
